# Patient Record
Sex: FEMALE | Race: ASIAN | NOT HISPANIC OR LATINO | URBAN - METROPOLITAN AREA
[De-identification: names, ages, dates, MRNs, and addresses within clinical notes are randomized per-mention and may not be internally consistent; named-entity substitution may affect disease eponyms.]

---

## 2018-06-25 ENCOUNTER — OFFICE VISIT - HEALTHEAST (OUTPATIENT)
Dept: INTERNAL MEDICINE | Facility: CLINIC | Age: 68
End: 2018-06-25

## 2018-06-25 DIAGNOSIS — Z78.0 POSTMENOPAUSAL STATUS: ICD-10-CM

## 2018-06-25 DIAGNOSIS — Z12.31 VISIT FOR SCREENING MAMMOGRAM: ICD-10-CM

## 2018-06-25 DIAGNOSIS — E11.40 TYPE 2 DIABETES MELLITUS WITH DIABETIC NEUROPATHY, WITHOUT LONG-TERM CURRENT USE OF INSULIN (H): ICD-10-CM

## 2018-06-25 DIAGNOSIS — E78.5 HYPERLIPIDEMIA LDL GOAL <100: ICD-10-CM

## 2018-06-25 DIAGNOSIS — I10 ESSENTIAL HYPERTENSION: ICD-10-CM

## 2018-06-25 DIAGNOSIS — N18.30 CHRONIC RENAL IMPAIRMENT, STAGE 3 (MODERATE) (H): ICD-10-CM

## 2018-06-25 DIAGNOSIS — F51.02 ADJUSTMENT INSOMNIA: ICD-10-CM

## 2018-06-25 DIAGNOSIS — Z23 NEED FOR VACCINATION: ICD-10-CM

## 2018-06-25 DIAGNOSIS — E55.9 VITAMIN D DEFICIENCY: ICD-10-CM

## 2018-06-25 LAB
ALBUMIN SERPL-MCNC: 3.9 G/DL (ref 3.5–5)
ALP SERPL-CCNC: 104 U/L (ref 45–120)
ALT SERPL W P-5'-P-CCNC: 23 U/L (ref 0–45)
ANION GAP SERPL CALCULATED.3IONS-SCNC: 13 MMOL/L (ref 5–18)
AST SERPL W P-5'-P-CCNC: 21 U/L (ref 0–40)
BASOPHILS # BLD AUTO: 0.1 THOU/UL (ref 0–0.2)
BASOPHILS NFR BLD AUTO: 1 % (ref 0–2)
BILIRUB SERPL-MCNC: 1.2 MG/DL (ref 0–1)
BUN SERPL-MCNC: 16 MG/DL (ref 8–22)
CALCIUM SERPL-MCNC: 9.6 MG/DL (ref 8.5–10.5)
CHLORIDE BLD-SCNC: 104 MMOL/L (ref 98–107)
CHOLEST SERPL-MCNC: 187 MG/DL
CO2 SERPL-SCNC: 24 MMOL/L (ref 22–31)
CREAT SERPL-MCNC: 2 MG/DL (ref 0.6–1.1)
CREAT UR-MCNC: 328.3 MG/DL
EOSINOPHIL # BLD AUTO: 0.2 THOU/UL (ref 0–0.4)
EOSINOPHIL NFR BLD AUTO: 3 % (ref 0–6)
ERYTHROCYTE [DISTWIDTH] IN BLOOD BY AUTOMATED COUNT: 12.9 % (ref 11–14.5)
FASTING STATUS PATIENT QL REPORTED: YES
GFR SERPL CREATININE-BSD FRML MDRD: 25 ML/MIN/1.73M2
GLUCOSE BLD-MCNC: 336 MG/DL (ref 70–125)
HBA1C MFR BLD: >14 % (ref 3.5–6)
HCT VFR BLD AUTO: 48.8 % (ref 35–47)
HDLC SERPL-MCNC: 48 MG/DL
HGB BLD-MCNC: 16.6 G/DL (ref 12–16)
LDLC SERPL CALC-MCNC: 80 MG/DL
LYMPHOCYTES # BLD AUTO: 2.1 THOU/UL (ref 0.8–4.4)
LYMPHOCYTES NFR BLD AUTO: 27 % (ref 20–40)
MCH RBC QN AUTO: 28.4 PG (ref 27–34)
MCHC RBC AUTO-ENTMCNC: 34 G/DL (ref 32–36)
MCV RBC AUTO: 83 FL (ref 80–100)
MICROALBUMIN UR-MCNC: 335.44 MG/DL (ref 0–1.99)
MICROALBUMIN/CREAT UR: 1021.7 MG/G
MONOCYTES # BLD AUTO: 0.6 THOU/UL (ref 0–0.9)
MONOCYTES NFR BLD AUTO: 7 % (ref 2–10)
NEUTROPHILS # BLD AUTO: 4.9 THOU/UL (ref 2–7.7)
NEUTROPHILS NFR BLD AUTO: 62 % (ref 50–70)
PLATELET # BLD AUTO: 207 THOU/UL (ref 140–440)
PMV BLD AUTO: 8.5 FL (ref 7–10)
POTASSIUM BLD-SCNC: 3.6 MMOL/L (ref 3.5–5)
PROT SERPL-MCNC: 6.6 G/DL (ref 6–8)
RBC # BLD AUTO: 5.85 MILL/UL (ref 3.8–5.4)
SODIUM SERPL-SCNC: 141 MMOL/L (ref 136–145)
TRIGL SERPL-MCNC: 293 MG/DL
TSH SERPL DL<=0.005 MIU/L-ACNC: 1.04 UIU/ML (ref 0.3–5)
WBC: 7.9 THOU/UL (ref 4–11)

## 2018-06-25 ASSESSMENT — MIFFLIN-ST. JEOR: SCORE: 1437.61

## 2018-06-26 ENCOUNTER — AMBULATORY - HEALTHEAST (OUTPATIENT)
Dept: PHARMACY | Facility: CLINIC | Age: 68
End: 2018-06-26

## 2018-06-26 ENCOUNTER — COMMUNICATION - HEALTHEAST (OUTPATIENT)
Dept: INTERNAL MEDICINE | Facility: CLINIC | Age: 68
End: 2018-06-26

## 2018-06-26 DIAGNOSIS — N18.4 CRI (CHRONIC RENAL INSUFFICIENCY), STAGE 4 (SEVERE) (H): ICD-10-CM

## 2018-06-26 DIAGNOSIS — E11.40 TYPE 2 DIABETES MELLITUS WITH DIABETIC NEUROPATHY, WITHOUT LONG-TERM CURRENT USE OF INSULIN (H): ICD-10-CM

## 2018-06-26 LAB — 25(OH)D3 SERPL-MCNC: 15.2 NG/ML (ref 30–80)

## 2018-06-28 ENCOUNTER — OFFICE VISIT - HEALTHEAST (OUTPATIENT)
Dept: PHARMACY | Facility: CLINIC | Age: 68
End: 2018-06-28

## 2018-06-28 DIAGNOSIS — I10 ESSENTIAL HYPERTENSION: ICD-10-CM

## 2018-06-28 DIAGNOSIS — F51.02 ADJUSTMENT INSOMNIA: ICD-10-CM

## 2018-06-28 DIAGNOSIS — E11.40 TYPE 2 DIABETES MELLITUS WITH DIABETIC NEUROPATHY, WITHOUT LONG-TERM CURRENT USE OF INSULIN (H): ICD-10-CM

## 2018-06-28 DIAGNOSIS — E78.5 HYPERLIPIDEMIA LDL GOAL <100: ICD-10-CM

## 2018-06-28 DIAGNOSIS — E55.9 VITAMIN D DEFICIENCY: ICD-10-CM

## 2018-06-29 ENCOUNTER — COMMUNICATION - HEALTHEAST (OUTPATIENT)
Dept: INTERNAL MEDICINE | Facility: CLINIC | Age: 68
End: 2018-06-29

## 2018-06-30 ENCOUNTER — COMMUNICATION - HEALTHEAST (OUTPATIENT)
Dept: NURSING | Facility: CLINIC | Age: 68
End: 2018-06-30

## 2018-07-12 ENCOUNTER — AMBULATORY - HEALTHEAST (OUTPATIENT)
Dept: EDUCATION SERVICES | Facility: CLINIC | Age: 68
End: 2018-07-12

## 2018-07-12 DIAGNOSIS — E11.40 TYPE 2 DIABETES MELLITUS WITH DIABETIC NEUROPATHY, WITHOUT LONG-TERM CURRENT USE OF INSULIN (H): ICD-10-CM

## 2018-07-16 ENCOUNTER — RECORDS - HEALTHEAST (OUTPATIENT)
Dept: ADMINISTRATIVE | Facility: OTHER | Age: 68
End: 2018-07-16

## 2018-07-26 ENCOUNTER — OFFICE VISIT - HEALTHEAST (OUTPATIENT)
Dept: PHARMACY | Facility: CLINIC | Age: 68
End: 2018-07-26

## 2018-07-26 DIAGNOSIS — I10 ESSENTIAL HYPERTENSION: ICD-10-CM

## 2018-07-26 DIAGNOSIS — E11.40 TYPE 2 DIABETES MELLITUS WITH DIABETIC NEUROPATHY, WITHOUT LONG-TERM CURRENT USE OF INSULIN (H): ICD-10-CM

## 2018-07-26 DIAGNOSIS — F51.02 ADJUSTMENT INSOMNIA: ICD-10-CM

## 2018-07-26 DIAGNOSIS — E78.5 HYPERLIPIDEMIA LDL GOAL <100: ICD-10-CM

## 2018-07-30 ENCOUNTER — RECORDS - HEALTHEAST (OUTPATIENT)
Dept: BONE DENSITY | Facility: CLINIC | Age: 68
End: 2018-07-30

## 2018-07-30 ENCOUNTER — RECORDS - HEALTHEAST (OUTPATIENT)
Dept: MAMMOGRAPHY | Facility: CLINIC | Age: 68
End: 2018-07-30

## 2018-07-30 ENCOUNTER — RECORDS - HEALTHEAST (OUTPATIENT)
Dept: ADMINISTRATIVE | Facility: OTHER | Age: 68
End: 2018-07-30

## 2018-07-30 DIAGNOSIS — Z12.31 ENCOUNTER FOR SCREENING MAMMOGRAM FOR MALIGNANT NEOPLASM OF BREAST: ICD-10-CM

## 2018-07-30 DIAGNOSIS — Z78.0 ASYMPTOMATIC MENOPAUSAL STATE: ICD-10-CM

## 2018-08-02 ENCOUNTER — OFFICE VISIT - HEALTHEAST (OUTPATIENT)
Dept: INTERNAL MEDICINE | Facility: CLINIC | Age: 68
End: 2018-08-02

## 2018-08-02 ENCOUNTER — COMMUNICATION - HEALTHEAST (OUTPATIENT)
Dept: INTERNAL MEDICINE | Facility: CLINIC | Age: 68
End: 2018-08-02

## 2018-08-02 ENCOUNTER — AMBULATORY - HEALTHEAST (OUTPATIENT)
Dept: LAB | Facility: CLINIC | Age: 68
End: 2018-08-02

## 2018-08-02 DIAGNOSIS — R06.83 SNORING: ICD-10-CM

## 2018-08-02 DIAGNOSIS — E11.40 TYPE 2 DIABETES MELLITUS WITH DIABETIC NEUROPATHY, WITHOUT LONG-TERM CURRENT USE OF INSULIN (H): ICD-10-CM

## 2018-08-02 DIAGNOSIS — D58.2 ELEVATED HEMOGLOBIN (H): ICD-10-CM

## 2018-08-02 DIAGNOSIS — I10 ESSENTIAL HYPERTENSION: ICD-10-CM

## 2018-08-02 LAB
ANION GAP SERPL CALCULATED.3IONS-SCNC: 8 MMOL/L (ref 5–18)
BUN SERPL-MCNC: 35 MG/DL (ref 8–22)
CALCIUM SERPL-MCNC: 9.5 MG/DL (ref 8.5–10.5)
CHLORIDE BLD-SCNC: 105 MMOL/L (ref 98–107)
CO2 SERPL-SCNC: 27 MMOL/L (ref 22–31)
CREAT SERPL-MCNC: 2.05 MG/DL (ref 0.6–1.1)
GFR SERPL CREATININE-BSD FRML MDRD: 24 ML/MIN/1.73M2
GLUCOSE BLD-MCNC: 155 MG/DL (ref 70–125)
HBA1C MFR BLD: 11.5 % (ref 3.5–6)
POTASSIUM BLD-SCNC: 5.1 MMOL/L (ref 3.5–5)
SODIUM SERPL-SCNC: 140 MMOL/L (ref 136–145)

## 2018-09-13 ENCOUNTER — OFFICE VISIT - HEALTHEAST (OUTPATIENT)
Dept: PHARMACY | Facility: CLINIC | Age: 68
End: 2018-09-13

## 2018-09-13 ENCOUNTER — COMMUNICATION - HEALTHEAST (OUTPATIENT)
Dept: PHARMACY | Facility: CLINIC | Age: 68
End: 2018-09-13

## 2018-09-13 DIAGNOSIS — E11.40 TYPE 2 DIABETES MELLITUS WITH DIABETIC NEUROPATHY, WITHOUT LONG-TERM CURRENT USE OF INSULIN (H): ICD-10-CM

## 2018-09-20 ENCOUNTER — OFFICE VISIT - HEALTHEAST (OUTPATIENT)
Dept: SLEEP MEDICINE | Facility: CLINIC | Age: 68
End: 2018-09-20

## 2018-09-20 DIAGNOSIS — Z91.89 AT RISK FOR SLEEP APNEA: ICD-10-CM

## 2018-09-20 DIAGNOSIS — G47.21 DELAYED SLEEP PHASE SYNDROME: ICD-10-CM

## 2018-09-20 DIAGNOSIS — R06.83 SNORING: ICD-10-CM

## 2018-09-20 DIAGNOSIS — G47.10 HYPERSOMNIA: ICD-10-CM

## 2018-09-20 ASSESSMENT — MIFFLIN-ST. JEOR: SCORE: 1501.11

## 2018-09-21 ENCOUNTER — COMMUNICATION - HEALTHEAST (OUTPATIENT)
Dept: PHARMACY | Facility: CLINIC | Age: 68
End: 2018-09-21

## 2018-12-20 ENCOUNTER — COMMUNICATION - HEALTHEAST (OUTPATIENT)
Dept: INTERNAL MEDICINE | Facility: CLINIC | Age: 68
End: 2018-12-20

## 2018-12-31 ENCOUNTER — COMMUNICATION - HEALTHEAST (OUTPATIENT)
Dept: INTERNAL MEDICINE | Facility: CLINIC | Age: 68
End: 2018-12-31

## 2018-12-31 DIAGNOSIS — E11.40 TYPE 2 DIABETES MELLITUS WITH DIABETIC NEUROPATHY, WITHOUT LONG-TERM CURRENT USE OF INSULIN (H): ICD-10-CM

## 2019-03-15 ENCOUNTER — RECORDS - HEALTHEAST (OUTPATIENT)
Dept: HEALTH INFORMATION MANAGEMENT | Facility: CLINIC | Age: 69
End: 2019-03-15

## 2019-03-21 ENCOUNTER — COMMUNICATION - HEALTHEAST (OUTPATIENT)
Dept: INTERNAL MEDICINE | Facility: CLINIC | Age: 69
End: 2019-03-21

## 2019-03-21 DIAGNOSIS — E11.40 TYPE 2 DIABETES MELLITUS WITH DIABETIC NEUROPATHY, WITHOUT LONG-TERM CURRENT USE OF INSULIN (H): ICD-10-CM

## 2019-03-25 ENCOUNTER — COMMUNICATION - HEALTHEAST (OUTPATIENT)
Dept: INTERNAL MEDICINE | Facility: CLINIC | Age: 69
End: 2019-03-25

## 2019-03-25 DIAGNOSIS — E55.9 VITAMIN D DEFICIENCY: ICD-10-CM

## 2019-03-25 DIAGNOSIS — E78.5 HYPERLIPIDEMIA LDL GOAL <100: ICD-10-CM

## 2019-03-29 ENCOUNTER — RECORDS - HEALTHEAST (OUTPATIENT)
Dept: ADMINISTRATIVE | Facility: OTHER | Age: 69
End: 2019-03-29

## 2019-04-11 ENCOUNTER — OFFICE VISIT - HEALTHEAST (OUTPATIENT)
Dept: INTERNAL MEDICINE | Facility: CLINIC | Age: 69
End: 2019-04-11

## 2019-04-11 DIAGNOSIS — N28.9 RENAL INSUFFICIENCY: ICD-10-CM

## 2019-04-11 DIAGNOSIS — R06.83 SNORING: ICD-10-CM

## 2019-04-11 DIAGNOSIS — J30.1 SEASONAL ALLERGIC RHINITIS DUE TO POLLEN: ICD-10-CM

## 2019-04-11 DIAGNOSIS — I10 ESSENTIAL HYPERTENSION: ICD-10-CM

## 2019-04-11 DIAGNOSIS — D58.2 ELEVATED HEMOGLOBIN (H): ICD-10-CM

## 2019-04-11 DIAGNOSIS — E11.40 TYPE 2 DIABETES MELLITUS WITH DIABETIC NEUROPATHY, WITHOUT LONG-TERM CURRENT USE OF INSULIN (H): ICD-10-CM

## 2019-04-11 DIAGNOSIS — Z23 NEED FOR 23-POLYVALENT PNEUMOCOCCAL POLYSACCHARIDE VACCINE: ICD-10-CM

## 2019-04-11 DIAGNOSIS — E55.9 VITAMIN D DEFICIENCY: ICD-10-CM

## 2019-04-11 LAB
ANION GAP SERPL CALCULATED.3IONS-SCNC: 11 MMOL/L (ref 5–18)
BASOPHILS # BLD AUTO: 0.1 THOU/UL (ref 0–0.2)
BASOPHILS NFR BLD AUTO: 1 % (ref 0–2)
BUN SERPL-MCNC: 26 MG/DL (ref 8–22)
CALCIUM SERPL-MCNC: 9.9 MG/DL (ref 8.5–10.5)
CHLORIDE BLD-SCNC: 109 MMOL/L (ref 98–107)
CO2 SERPL-SCNC: 26 MMOL/L (ref 22–31)
CREAT SERPL-MCNC: 2.03 MG/DL (ref 0.6–1.1)
CREAT UR-MCNC: 281.3 MG/DL
EOSINOPHIL # BLD AUTO: 0.4 THOU/UL (ref 0–0.4)
EOSINOPHIL NFR BLD AUTO: 4 % (ref 0–6)
ERYTHROCYTE [DISTWIDTH] IN BLOOD BY AUTOMATED COUNT: 11.6 % (ref 11–14.5)
GFR SERPL CREATININE-BSD FRML MDRD: 24 ML/MIN/1.73M2
GLUCOSE BLD-MCNC: 101 MG/DL (ref 70–125)
HBA1C MFR BLD: 5.9 % (ref 3.5–6)
HCT VFR BLD AUTO: 42.2 % (ref 35–47)
HGB BLD-MCNC: 14.1 G/DL (ref 12–16)
LYMPHOCYTES # BLD AUTO: 2.2 THOU/UL (ref 0.8–4.4)
LYMPHOCYTES NFR BLD AUTO: 24 % (ref 20–40)
MCH RBC QN AUTO: 28.4 PG (ref 27–34)
MCHC RBC AUTO-ENTMCNC: 33.4 G/DL (ref 32–36)
MCV RBC AUTO: 85 FL (ref 80–100)
MICROALBUMIN UR-MCNC: 153.47 MG/DL (ref 0–1.99)
MICROALBUMIN/CREAT UR: 545.6 MG/G
MONOCYTES # BLD AUTO: 0.5 THOU/UL (ref 0–0.9)
MONOCYTES NFR BLD AUTO: 6 % (ref 2–10)
NEUTROPHILS # BLD AUTO: 5.9 THOU/UL (ref 2–7.7)
NEUTROPHILS NFR BLD AUTO: 65 % (ref 50–70)
PLATELET # BLD AUTO: 219 THOU/UL (ref 140–440)
PMV BLD AUTO: 7.9 FL (ref 7–10)
POTASSIUM BLD-SCNC: 4.3 MMOL/L (ref 3.5–5)
RBC # BLD AUTO: 4.96 MILL/UL (ref 3.8–5.4)
SODIUM SERPL-SCNC: 146 MMOL/L (ref 136–145)
WBC: 9.1 THOU/UL (ref 4–11)

## 2019-04-11 RX ORDER — LORATADINE 10 MG/1
10 TABLET ORAL DAILY
Qty: 30 TABLET | Refills: 11 | Status: SHIPPED | OUTPATIENT
Start: 2019-04-11

## 2019-04-11 ASSESSMENT — MIFFLIN-ST. JEOR: SCORE: 1421.73

## 2019-04-12 LAB
25(OH)D3 SERPL-MCNC: 32.8 NG/ML (ref 30–80)
25(OH)D3 SERPL-MCNC: 32.8 NG/ML (ref 30–80)

## 2019-04-13 ENCOUNTER — COMMUNICATION - HEALTHEAST (OUTPATIENT)
Dept: INTERNAL MEDICINE | Facility: CLINIC | Age: 69
End: 2019-04-13

## 2019-04-14 ENCOUNTER — COMMUNICATION - HEALTHEAST (OUTPATIENT)
Dept: INTERNAL MEDICINE | Facility: CLINIC | Age: 69
End: 2019-04-14

## 2019-04-14 DIAGNOSIS — E11.40 TYPE 2 DIABETES MELLITUS WITH DIABETIC NEUROPATHY, WITHOUT LONG-TERM CURRENT USE OF INSULIN (H): ICD-10-CM

## 2019-04-22 ENCOUNTER — COMMUNICATION - HEALTHEAST (OUTPATIENT)
Dept: NURSING | Facility: CLINIC | Age: 69
End: 2019-04-22

## 2019-04-24 ENCOUNTER — RECORDS - HEALTHEAST (OUTPATIENT)
Dept: HEALTH INFORMATION MANAGEMENT | Facility: CLINIC | Age: 69
End: 2019-04-24

## 2019-04-26 ENCOUNTER — COMMUNICATION - HEALTHEAST (OUTPATIENT)
Dept: INTERNAL MEDICINE | Facility: CLINIC | Age: 69
End: 2019-04-26

## 2019-04-26 ENCOUNTER — COMMUNICATION - HEALTHEAST (OUTPATIENT)
Dept: NURSING | Facility: CLINIC | Age: 69
End: 2019-04-26

## 2019-04-29 ENCOUNTER — COMMUNICATION - HEALTHEAST (OUTPATIENT)
Dept: NURSING | Facility: CLINIC | Age: 69
End: 2019-04-29

## 2019-04-29 DIAGNOSIS — E11.40 TYPE 2 DIABETES MELLITUS WITH DIABETIC NEUROPATHY, WITHOUT LONG-TERM CURRENT USE OF INSULIN (H): ICD-10-CM

## 2019-05-03 ENCOUNTER — COMMUNICATION - HEALTHEAST (OUTPATIENT)
Dept: INTERNAL MEDICINE | Facility: CLINIC | Age: 69
End: 2019-05-03

## 2019-05-16 ENCOUNTER — COMMUNICATION - HEALTHEAST (OUTPATIENT)
Dept: INTERNAL MEDICINE | Facility: CLINIC | Age: 69
End: 2019-05-16

## 2019-06-16 ENCOUNTER — COMMUNICATION - HEALTHEAST (OUTPATIENT)
Dept: NURSING | Facility: CLINIC | Age: 69
End: 2019-06-16

## 2019-06-25 ENCOUNTER — RECORDS - HEALTHEAST (OUTPATIENT)
Dept: ADMINISTRATIVE | Facility: OTHER | Age: 69
End: 2019-06-25

## 2019-06-27 ENCOUNTER — OFFICE VISIT - HEALTHEAST (OUTPATIENT)
Dept: INTERNAL MEDICINE | Facility: CLINIC | Age: 69
End: 2019-06-27

## 2019-06-27 DIAGNOSIS — N18.4 CHRONIC RENAL IMPAIRMENT, STAGE 4 (SEVERE) (H): ICD-10-CM

## 2019-06-27 DIAGNOSIS — I10 ESSENTIAL HYPERTENSION: ICD-10-CM

## 2019-06-27 DIAGNOSIS — R05.9 COUGH: ICD-10-CM

## 2019-06-27 DIAGNOSIS — Z00.00 ROUTINE GENERAL MEDICAL EXAMINATION AT A HEALTH CARE FACILITY: ICD-10-CM

## 2019-06-27 DIAGNOSIS — N93.9 VAGINAL SPOTTING: ICD-10-CM

## 2019-06-27 DIAGNOSIS — E11.40 TYPE 2 DIABETES MELLITUS WITH DIABETIC NEUROPATHY, WITHOUT LONG-TERM CURRENT USE OF INSULIN (H): ICD-10-CM

## 2019-06-27 LAB
BASOPHILS # BLD AUTO: 0 THOU/UL (ref 0–0.2)
BASOPHILS NFR BLD AUTO: 1 % (ref 0–2)
EOSINOPHIL # BLD AUTO: 0.2 THOU/UL (ref 0–0.4)
EOSINOPHIL NFR BLD AUTO: 3 % (ref 0–6)
ERYTHROCYTE [DISTWIDTH] IN BLOOD BY AUTOMATED COUNT: 12.4 % (ref 11–14.5)
HBA1C MFR BLD: 6.4 % (ref 3.5–6)
HCT VFR BLD AUTO: 36.3 % (ref 35–47)
HGB BLD-MCNC: 12.2 G/DL (ref 12–16)
LYMPHOCYTES # BLD AUTO: 2.1 THOU/UL (ref 0.8–4.4)
LYMPHOCYTES NFR BLD AUTO: 32 % (ref 20–40)
MCH RBC QN AUTO: 28.4 PG (ref 27–34)
MCHC RBC AUTO-ENTMCNC: 33.6 G/DL (ref 32–36)
MCV RBC AUTO: 85 FL (ref 80–100)
MONOCYTES # BLD AUTO: 0.5 THOU/UL (ref 0–0.9)
MONOCYTES NFR BLD AUTO: 8 % (ref 2–10)
NEUTROPHILS # BLD AUTO: 3.8 THOU/UL (ref 2–7.7)
NEUTROPHILS NFR BLD AUTO: 57 % (ref 50–70)
PLATELET # BLD AUTO: 212 THOU/UL (ref 140–440)
PMV BLD AUTO: 8.3 FL (ref 7–10)
RBC # BLD AUTO: 4.29 MILL/UL (ref 3.8–5.4)
TSH SERPL DL<=0.005 MIU/L-ACNC: 0.39 UIU/ML (ref 0.3–5)
WBC: 6.7 THOU/UL (ref 4–11)

## 2019-06-27 RX ORDER — CARVEDILOL 25 MG/1
25 TABLET ORAL 2 TIMES DAILY
Qty: 180 TABLET | Refills: 3 | Status: SHIPPED | OUTPATIENT
Start: 2019-06-27 | End: 2019-09-25

## 2019-06-27 RX ORDER — AMLODIPINE BESYLATE 10 MG/1
10 TABLET ORAL DAILY
Qty: 90 TABLET | Refills: 3 | Status: SHIPPED | OUTPATIENT
Start: 2019-06-27 | End: 2019-09-25

## 2019-06-27 ASSESSMENT — MIFFLIN-ST. JEOR: SCORE: 1411.87

## 2019-06-29 ENCOUNTER — COMMUNICATION - HEALTHEAST (OUTPATIENT)
Dept: INTERNAL MEDICINE | Facility: CLINIC | Age: 69
End: 2019-06-29

## 2019-06-29 DIAGNOSIS — E11.40 TYPE 2 DIABETES MELLITUS WITH DIABETIC NEUROPATHY, WITHOUT LONG-TERM CURRENT USE OF INSULIN (H): ICD-10-CM

## 2019-06-29 DIAGNOSIS — I10 ESSENTIAL HYPERTENSION: ICD-10-CM

## 2019-06-30 ENCOUNTER — COMMUNICATION - HEALTHEAST (OUTPATIENT)
Dept: INTERNAL MEDICINE | Facility: CLINIC | Age: 69
End: 2019-06-30

## 2019-06-30 DIAGNOSIS — E11.40 TYPE 2 DIABETES MELLITUS WITH DIABETIC NEUROPATHY, WITHOUT LONG-TERM CURRENT USE OF INSULIN (H): ICD-10-CM

## 2019-06-30 RX ORDER — GLUCOSAMINE HCL/CHONDROITIN SU 500-400 MG
1 CAPSULE ORAL
Qty: 100 STRIP | Refills: 3 | Status: SHIPPED | OUTPATIENT
Start: 2019-06-30

## 2019-07-01 ENCOUNTER — HOSPITAL ENCOUNTER (OUTPATIENT)
Dept: ULTRASOUND IMAGING | Facility: HOSPITAL | Age: 69
Discharge: HOME | End: 2019-07-01
Attending: INTERNAL MEDICINE

## 2019-07-01 DIAGNOSIS — N93.9 VAGINAL SPOTTING: ICD-10-CM

## 2019-07-01 DIAGNOSIS — N18.4 CHRONIC RENAL DISEASE, STAGE IV (H): ICD-10-CM

## 2019-07-08 ENCOUNTER — COMMUNICATION - HEALTHEAST (OUTPATIENT)
Dept: INTERNAL MEDICINE | Facility: CLINIC | Age: 69
End: 2019-07-08

## 2019-07-08 DIAGNOSIS — E11.40 TYPE 2 DIABETES MELLITUS WITH DIABETIC NEUROPATHY, WITHOUT LONG-TERM CURRENT USE OF INSULIN (H): ICD-10-CM

## 2019-07-09 RX ORDER — PEN NEEDLE, DIABETIC 32GX 5/32"
NEEDLE, DISPOSABLE MISCELLANEOUS
Qty: 100 EACH | Refills: 3 | Status: SHIPPED | OUTPATIENT
Start: 2019-07-09

## 2019-07-11 ENCOUNTER — COMMUNICATION - HEALTHEAST (OUTPATIENT)
Dept: NURSING | Facility: CLINIC | Age: 69
End: 2019-07-11

## 2019-07-11 ENCOUNTER — COMMUNICATION - HEALTHEAST (OUTPATIENT)
Dept: INTERNAL MEDICINE | Facility: CLINIC | Age: 69
End: 2019-07-11

## 2019-07-11 DIAGNOSIS — E11.40 TYPE 2 DIABETES MELLITUS WITH DIABETIC NEUROPATHY, WITHOUT LONG-TERM CURRENT USE OF INSULIN (H): ICD-10-CM

## 2019-07-12 ENCOUNTER — AMBULATORY - HEALTHEAST (OUTPATIENT)
Dept: INTERNAL MEDICINE | Facility: CLINIC | Age: 69
End: 2019-07-12

## 2019-07-12 ENCOUNTER — COMMUNICATION - HEALTHEAST (OUTPATIENT)
Dept: INTERNAL MEDICINE | Facility: CLINIC | Age: 69
End: 2019-07-12

## 2019-07-18 ENCOUNTER — RECORDS - HEALTHEAST (OUTPATIENT)
Dept: ADMINISTRATIVE | Facility: OTHER | Age: 69
End: 2019-07-18

## 2019-07-18 ENCOUNTER — COMMUNICATION - HEALTHEAST (OUTPATIENT)
Dept: INTERNAL MEDICINE | Facility: CLINIC | Age: 69
End: 2019-07-18

## 2019-07-18 DIAGNOSIS — E11.40 TYPE 2 DIABETES MELLITUS WITH DIABETIC NEUROPATHY, WITHOUT LONG-TERM CURRENT USE OF INSULIN (H): ICD-10-CM

## 2019-07-18 RX ORDER — LANCETS 33 GAUGE
EACH MISCELLANEOUS
Qty: 200 EACH | Refills: 3 | Status: SHIPPED | OUTPATIENT
Start: 2019-07-18

## 2019-07-19 ENCOUNTER — COMMUNICATION - HEALTHEAST (OUTPATIENT)
Dept: INTERNAL MEDICINE | Facility: CLINIC | Age: 69
End: 2019-07-19

## 2019-07-19 ENCOUNTER — COMMUNICATION - HEALTHEAST (OUTPATIENT)
Dept: NURSING | Facility: CLINIC | Age: 69
End: 2019-07-19

## 2019-07-19 DIAGNOSIS — E11.9 TYPE 2 DIABETES MELLITUS WITHOUT COMPLICATION, WITH LONG-TERM CURRENT USE OF INSULIN (H): ICD-10-CM

## 2019-07-19 DIAGNOSIS — Z79.4 TYPE 2 DIABETES MELLITUS WITHOUT COMPLICATION, WITH LONG-TERM CURRENT USE OF INSULIN (H): ICD-10-CM

## 2019-07-19 RX ORDER — GLUCOSAMINE HCL/CHONDROITIN SU 500-400 MG
1 CAPSULE ORAL 3 TIMES DAILY
Qty: 400 STRIP | Refills: 3 | Status: SHIPPED | OUTPATIENT
Start: 2019-07-19

## 2019-07-23 ENCOUNTER — RECORDS - HEALTHEAST (OUTPATIENT)
Dept: HEALTH INFORMATION MANAGEMENT | Facility: CLINIC | Age: 69
End: 2019-07-23

## 2019-08-07 ENCOUNTER — COMMUNICATION - HEALTHEAST (OUTPATIENT)
Dept: INTERNAL MEDICINE | Facility: CLINIC | Age: 69
End: 2019-08-07

## 2019-08-07 DIAGNOSIS — E11.40 TYPE 2 DIABETES MELLITUS WITH DIABETIC NEUROPATHY, WITHOUT LONG-TERM CURRENT USE OF INSULIN (H): ICD-10-CM

## 2019-08-18 ENCOUNTER — COMMUNICATION - HEALTHEAST (OUTPATIENT)
Dept: INTERNAL MEDICINE | Facility: CLINIC | Age: 69
End: 2019-08-18

## 2019-08-18 DIAGNOSIS — E11.40 TYPE 2 DIABETES MELLITUS WITH DIABETIC NEUROPATHY, WITHOUT LONG-TERM CURRENT USE OF INSULIN (H): ICD-10-CM

## 2019-10-07 ENCOUNTER — COMMUNICATION - HEALTHEAST (OUTPATIENT)
Dept: INTERNAL MEDICINE | Facility: CLINIC | Age: 69
End: 2019-10-07

## 2019-10-07 DIAGNOSIS — E78.5 HYPERLIPIDEMIA LDL GOAL <100: ICD-10-CM

## 2019-10-07 DIAGNOSIS — J30.1 SEASONAL ALLERGIC RHINITIS DUE TO POLLEN: ICD-10-CM

## 2019-10-09 RX ORDER — ATORVASTATIN CALCIUM 20 MG/1
TABLET, FILM COATED ORAL
Qty: 90 TABLET | Refills: 2 | Status: SHIPPED | OUTPATIENT
Start: 2019-10-09

## 2020-01-25 ENCOUNTER — COMMUNICATION - HEALTHEAST (OUTPATIENT)
Dept: INTERNAL MEDICINE | Facility: CLINIC | Age: 70
End: 2020-01-25

## 2020-01-25 DIAGNOSIS — I10 ESSENTIAL HYPERTENSION: ICD-10-CM

## 2020-01-25 DIAGNOSIS — Z79.4 TYPE 2 DIABETES MELLITUS WITHOUT COMPLICATION, WITH LONG-TERM CURRENT USE OF INSULIN (H): ICD-10-CM

## 2020-01-25 DIAGNOSIS — E11.9 TYPE 2 DIABETES MELLITUS WITHOUT COMPLICATION, WITH LONG-TERM CURRENT USE OF INSULIN (H): ICD-10-CM

## 2020-01-26 RX ORDER — HYDRALAZINE HYDROCHLORIDE 50 MG/1
TABLET, FILM COATED ORAL
Qty: 180 TABLET | Refills: 2 | Status: SHIPPED | OUTPATIENT
Start: 2020-01-26

## 2020-01-26 RX ORDER — BENAZEPRIL HYDROCHLORIDE 20 MG/1
TABLET ORAL
Qty: 90 TABLET | Refills: 2 | Status: SHIPPED | OUTPATIENT
Start: 2020-01-26

## 2020-08-12 ENCOUNTER — COMMUNICATION - HEALTHEAST (OUTPATIENT)
Dept: INTERNAL MEDICINE | Facility: CLINIC | Age: 70
End: 2020-08-12

## 2020-08-12 DIAGNOSIS — E11.40 TYPE 2 DIABETES MELLITUS WITH DIABETIC NEUROPATHY, WITHOUT LONG-TERM CURRENT USE OF INSULIN (H): ICD-10-CM

## 2020-11-11 ENCOUNTER — COMMUNICATION - HEALTHEAST (OUTPATIENT)
Dept: INTERNAL MEDICINE | Facility: CLINIC | Age: 70
End: 2020-11-11

## 2020-11-11 DIAGNOSIS — E11.40 TYPE 2 DIABETES MELLITUS WITH DIABETIC NEUROPATHY, WITHOUT LONG-TERM CURRENT USE OF INSULIN (H): ICD-10-CM

## 2020-11-11 RX ORDER — SEMAGLUTIDE 1.34 MG/ML
INJECTION, SOLUTION SUBCUTANEOUS
Qty: 3 SYRINGE | Refills: 6 | Status: SHIPPED | OUTPATIENT
Start: 2020-11-11 | End: 2021-12-31

## 2021-05-26 NOTE — TELEPHONE ENCOUNTER
RN cannot approve Refill Request    RN can NOT refill this medication Protocol failed and NO refill given.         Caitlin Wyatt, [facility] Triage/Med Refill 3/22/2019    Requested Prescriptions   Pending Prescriptions Disp Refills     LANTUS SOLOSTAR U-100 INSULIN 100 unit/mL (3 mL) pen [Pharmacy Med Name: LANTUS SOLOSTAR PEN INJ 3ML] 9 mL 0     Sig: INJECT 10 UNITS EVERY NIGHT AT BEDTIME    Insulin/GLP-1 Refill Protocol Failed - 3/21/2019  3:23 PM       Failed - Visit with PCP or prescribing provider visit in last 6 months    Last office visit with prescriber/PCP: Visit date not found OR same dept: 8/2/2018 Airam Muhammad MD OR same specialty: 8/2/2018 Airam Muhmamad MD Last physical: Visit date not found Last MTM visit: Visit date not found     Next appt within 3 mo: Visit date not found  Next physical within 3 mo: Visit date not found  Prescriber OR PCP: Airam Muhammad MD  Last diagnosis associated with med order: 1. Type 2 diabetes mellitus with diabetic neuropathy, without long-term current use of insulin (H)  - LANTUS SOLOSTAR U-100 INSULIN 100 unit/mL (3 mL) pen [Pharmacy Med Name: LANTUS SOLOSTAR PEN INJ 3ML]; INJECT 10 UNITS EVERY NIGHT AT BEDTIME  Dispense: 9 mL; Refill: 0    If protocol passes may refill for 6 months if within 3 months of last provider visit (or a total of 9 months).             Failed - A1C in last 6 months    Hemoglobin A1c   Date Value Ref Range Status   08/02/2018 11.5 (H) 3.5 - 6.0 % Final              Passed - Microalbumin in last year    Microalbumin, Random Urine   Date Value Ref Range Status   06/25/2018 335.44 (H) 0.00 - 1.99 mg/dL Final                 Passed - Blood pressure in last year    BP Readings from Last 1 Encounters:   09/20/18 146/75            Passed - Creatinine done in last year    Creatinine   Date Value Ref Range Status   08/02/2018 2.05 (H) 0.60 - 1.10 mg/dL Final

## 2021-05-27 NOTE — PATIENT INSTRUCTIONS - HE
1. Due to low sugars in the morning, let's decrease Glipizide to 5 mg with dinner. Continue 10 mg with breakfast. Continue current dose of ozempic and Lantus.    2. Increase hydralazine from 25 mg to 50 mg twice     3. Get shingles vaccine and tetanus booster at the pharmacy    4. See kidney doctor to establish care due to renal problems and protein in the urine.    5. Call back with complete sugar report for past 2 weeks tomorrow and also call again with sugar records in 2 weeks.     6. Pneumonia shot today

## 2021-05-27 NOTE — TELEPHONE ENCOUNTER
Patient Returning Call  Reason for call:  Returning call from clinic  Information relayed to patient:  Message below. Patient states she is currently taking 20 units of Lantus in addition to Ozempick. Patient scheduled for a diabetic check with PCP on 4/11/19.   Patient has additional questions:  No  If YES, what are your questions/concerns:  N/A  Okay to leave a detailed message?: No call back needed

## 2021-05-27 NOTE — TELEPHONE ENCOUNTER
Bebeto Jade,    Please talk to this patient ( please see my note).  She could not access her sugar report during our last visit. A1C is much improved since we added Ozempic. She is still on the low dose 0.25 and I did not want to increase it since potentially it can affect her kidneys (from SE review). My concern now is hypoglycemia. She is on insulin and glipizide. Creat is 2.0. I decreased her evening glipizide and would like to make sure sugars are better.  Please contact her non urgently sometime this week to review..    Thank you!  Dr WALDROP

## 2021-05-27 NOTE — PROGRESS NOTES
[facility] Follow Up Note    Assessment/Plan:    1. Type 2 diabetes mellitus with diabetic neuropathy, without long-term current use of insulin (H)  Management is complicated by renal insufficiency and creatinine at 2.0.  Previous A1c was 11.5 and we will repeat it again today however home sugar measurements would be most accurate.  She could not open her To review her sugars with me but will email me through my chart.  Per her report morning sugars have been in good range and she has been having more low sugars down to 50 or 60 in the morning.  Since breakfast is her largest meal and dinner is her smallest meal will decrease her glipizide to 5 mg with dinner and she will continue 10 mg with breakfast.  We will continue her currently on a smaller dose so was then pick at 0.25 mg due to her history of renal insufficiency (potentially it can affect renal function).  She will continue 20 units of Lantus.  She is on aspirin.  We will need to control her blood pressure better.  She reports that she saw ophthalmologist earlier this year and does not have retinopathy.  DL was 80 in summer 2018.  - glipiZIDE (GLUCOTROL) 10 MG tablet; Take 1 tab with breakfast and 1/2 tab with dinner  Dispense: 135 tablet; Refill: 3  - blood glucose test strips; Use 1 each As Directed 3 (three) times a day before meals. Dispense brand per patient's insurance at pharmacy discretion.  Dispense: 100 strip; Refill: 11  - Glycosylated Hemoglobin A1c  - Microalbumin, Random Urine  - Basic Metabolic Panel    2. Need for 23-polyvalent pneumococcal polysaccharide vaccine  - Pneumococcal polysaccharide vaccine 23-valent 3 yo or older, subq/IM    3. Renal insufficiency with proteinuria  She is on ACE inhibitor, potassium levels are good, hemoglobin levels are good.  Renal function has been stable since 2016 and will continue to monitor it.  Since she has stage IV renal disease I did recommend that she sees a nephrologist to establish  care.  - Basic Metabolic Panel  - Ambulatory referral to Nephrology  - HM1(CBC and Differential)  - HM1 (CBC with Diff)    4. Vitamin D deficiency  Takes vitamin D 1000 units 3 times a day  - Vitamin D, Total (25-Hydroxy)    5. Elevated hemoglobin (H)  He was checked on her sugars were uncontrolled so could be due to dehydration of sleep apnea.  We will check CBC today    6. Essential hypertension  She is on numerous blood pressure medications including amlodipine 10 mg, benazepril 20 mg, Coreg 25 mg twice a day and hydralazine 25 twice a day.  Discussed the goal blood pressure less than 130 and she is above goal.  We will increase her hydralazine to 50 mg twice a day  - hydrALAZINE (APRESOLINE) 50 MG tablet; Take 1 tablet (50 mg total) by mouth 2 (two) times a day.  Dispense: 180 tablet; Refill: 2    7. Snoring  Mild.  In the past to refer her to sleep clinic but patient did not go there    8. Seasonal allergic rhinitis due to pollen  - fluticasone propionate (FLONASE ALLERGY RELIEF) 50 mcg/actuation nasal spray; 1 spray into each nostril daily.  Dispense: 16 g; Refill: 2  - loratadine (CLARITIN) 10 mg tablet; Take 1 tablet (10 mg total) by mouth daily.  Dispense: 30 tablet; Refill: 11      Airam Muhammad MD    Chief Complaint:  Chief Complaint   Patient presents with     Diabetes Mellitus       History of Present Illness:  Samira is a 68 y.o. female with history of type 2 diabetes, CKD, neuropathy, high blood pressure, hyperlipidemia, history of cholecystectomy, appendectomy and uterine fibroids.   She is currently here for follow-up.  She is accompanied by her daughter.    Patient has type 2 diabetes and renal insufficiency with creatinine of 2.0.  Last time I saw her over 6 months ago and her A1c was 11.  At that time she was on Lantus 20 units and glipizide.  She saw pharmacist and was started on on exam PICC and currently takes 0.25 mg once a week.  She feels that her sugars have gotten better.  She was  unable to open her On the phone to show me the complete data but reports that in the mornings they usually between 90 and 120 however she has been having sugars as low as 50 sometimes in the morning.  She takes Lantus 20 units in the evening and glipizide 10 mg twice a day.  Breakfast is her biggest meal and then she eats less for lunch and dinner.  We discussed decreasing her glipizide dose to 5 mg with dinner and continuing 10 with breakfast.  For now we will keep the same dose of Lantus and as them pack.  It appears that as empiric potentially can affect kidney function and currently I am not going to increase it further.    Patient also has stage IV renal insufficiency with proteinuria.  We will check her creatinine today but from chart review it appears since 2016 her creatinine level has been stable.  No history of anemia.  We discussed that would be a good time for her to establish care with nephrologist and referral was provided.  Her blood pressure continues to be above goal.  She is currently on multiple medications including benazepril, amlodipine, Coreg and hydralazine.  On last visit I increased her hydralazine from 10-25 twice a day.  Today since blood pressure is above 130 will increase it further to 50 mg twice a day new prescription was sent to pharmacy.  She does have mild snoring in the past to refer her to sleep clinic but she has not visited there.  We will see what hemoglobin level is today (in the past it has been elevated potentially secondary to dehydration due to high sugars or sleep apnea.    Currently she tries to stay healthy and walks to 3 miles a day when the weather is good.  She did see ophthalmologist recently and has not had any retinopathy but does have glaucoma.  Since she started was then picked she did lose 9 pounds.      Review of Systems:  A comprehensive review of systems was performed and was otherwise negative    PFSH:  Social History: Reviewed, she has kids in different  "states and travels a log.  She is going to [place] for a month and a half in 1 week.  Social History     Tobacco Use   Smoking Status Former Smoker     Years: 10.00   Smokeless Tobacco Never Used     Social History     Social History Narrative    Pt moved from [place] to [place] in 2018. 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 Travels a lot.       Past History: Reviewed  Current Outpatient Medications   Medication Sig Dispense Refill     aspirin 81 MG EC tablet Take 1 tablet (81 mg total) by mouth daily.  0     atorvastatin (LIPITOR) 20 MG tablet Take 1 tablet (20 mg total) by mouth daily. 90 tablet 1     benazepril (LOTENSIN) 20 MG tablet Take 1 tablet (20 mg total) by mouth daily. 90 tablet 3     blood glucose meter (GLUCOMETER) Use 1 each As Directed 2 (two) times a day. Dispense glucometer brand per patient's insurance at pharmacy discretion. 1 each 0     blood glucose test strips Use 1 each As Directed 3 (three) times a day before meals. Dispense brand per patient's insurance at pharmacy discretion. 100 strip 11     cholecalciferol, vitamin D3, 1,000 unit tablet TAKE THREE TABLETS (3000 IU) BY MOUTH DAILY 270 tablet 1     fluticasone propionate (FLONASE ALLERGY RELIEF) 50 mcg/actuation nasal spray 1 spray into each nostril daily. 16 g 2     generic lancets Use 1 each As Directed 2 (two) times a day. Dispense brand per patient's insurance at pharmacy discretion. 100 each 11     insulin glargine (LANTUS SOLOSTAR U-100 INSULIN) 100 unit/mL (3 mL) pen Inject 20 units at bed time 9 mL 11     OZEMPIC 0.25 mg or 0.5 mg(2 mg/1.5 mL) PnIj Inject 0.25 mg under the skin every 7 days. For four weeks, then increase to 0.5mg weekly for four weeks. 1.5 mL 3     pen needle, diabetic ([employer] PEN NEEDLE) 32 gauge x 5/32\" Ndle Use 1 each As Directed daily. With lantus 100 each 3     amLODIPine (NORVASC) 10 MG tablet Take 1 tablet (10 mg total) by mouth daily. 90 tablet 3     carvedilol (COREG) 25 MG tablet Take 1 tablet (25 " "mg total) by mouth 2 (two) times a day. 180 tablet 3     glipiZIDE (GLUCOTROL) 10 MG tablet Take 1 tab with breakfast and 1/2 tab with dinner 135 tablet 3     hydrALAZINE (APRESOLINE) 50 MG tablet Take 1 tablet (50 mg total) by mouth 2 (two) times a day. 180 tablet 2     loratadine (CLARITIN) 10 mg tablet Take 1 tablet (10 mg total) by mouth daily. 30 tablet 11     No current facility-administered medications for this visit.        Family History: Reviewed    Physical Exam:    Vitals:    04/11/19 1242   BP: 134/80   Patient Site: Left Arm   Patient Position: Sitting   Cuff Size: Adult Regular   Pulse: 74   SpO2: 98%   Weight: 190 lb 8 oz (86.4 kg)   Height: 5' 7\" (1.702 m)     Wt Readings from Last 3 Encounters:   04/11/19 190 lb 8 oz (86.4 kg)   09/20/18 208 lb (94.3 kg)   09/13/18 204 lb 8 oz (92.8 kg)     Body mass index is 29.84 kg/m .    Constitutional:  Reveals a pleasant female.  Vitals:  Per nursing notes.  HEENT:No cervical LAD, no thyromegaly,  conjunctiva is pink, no scleral icterus, TMs are visualized and normal bl, oropharynx is clear, no exudates, slightly crowded  Cardiac:  Regular rate and rhythm,no murmurs, rubs, or gallops. Legs without edema. Palpation of the radial pulse regular.  Lungs: Clear to auscultation bl.  Respiratory effort normal.  Abdomen:positive BS, soft, nontender, nondistended.  No hepato-splenomagaly  Rheumatologic: Normal joints and nails of the hands.  Neurologic:  Cranial nerves II-XII intact.     Psychiatric: affect appropriate, memory intact.     Data Review:    Analysis and Summary of Old Records (2): yes      Records Requested (1): no      Other History Summarized (from other people in the room) (2): daughter    Radiology Tests Summarized (XRAY/CT/MRI/DXA) (1): no    Labs Reviewed (1): yes    Medicine Tests Reviewed (EKG/ECHO/COLONOSCOPY/EGD) (1): no    Independent Review of EKG or X-RAY (2): no      "

## 2021-05-27 NOTE — TELEPHONE ENCOUNTER
Please see if she is on Lantus only (and what dose) or has started Ozempick in addition to it ( was added by parmD in September). When I saw her last, she was on 20 units of Lantus.    Please ask her to set up a follow up visit to f/u on DM.

## 2021-05-27 NOTE — TELEPHONE ENCOUNTER
RN cannot approve Refill Request    RN can NOT refill this medication med is not covered by policy/route to provider.    Mike Roa, [facility] Triage/Med Refill 4/15/2019    Requested Prescriptions   Pending Prescriptions Disp Refills     semaglutide (OZEMPIC) 0.25 mg or 0.5 mg(2 mg/1.5 mL) PnIj 1.5 mL 3       There is no refill protocol information for this order

## 2021-05-27 NOTE — TELEPHONE ENCOUNTER
Refill Approved    Rx renewed per Medication Renewal Policy. Medication was last renewed on 6/25/18.    Caitlin Wyatt, [facility] Triage/Med Refill 3/25/2019     Requested Prescriptions   Pending Prescriptions Disp Refills     cholecalciferol, vitamin D3, 1,000 unit tablet [Pharmacy Med Name: Vitamin D3 Oral Tablet 1000 UNIT] 270 tablet 1     Sig: TAKE THREE TABLETS (3000 IU) BY MOUTH DAILY    There is no refill protocol information for this order        atorvastatin (LIPITOR) 20 MG tablet [Pharmacy Med Name: Atorvastatin Calcium Oral Tablet 20 MG] 90 tablet 1     Sig: Take 1 tablet (20 mg total) by mouth daily.    Statins Refill Protocol (Hmg CoA Reductase Inhibitors) Passed - 3/25/2019  7:01 AM       Passed - PCP or prescribing provider visit in past 12 months     Last office visit with prescriber/PCP: 8/2/2018 Airam Muhammad MD OR same dept: 8/2/2018 Airam Muhammad MD OR same specialty: 8/2/2018 Airam Muhammad MD  Last physical: Visit date not found Last MTM visit: Visit date not found   Next visit within 3 mo: Visit date not found  Next physical within 3 mo: Visit date not found  Prescriber OR PCP: Airam Muhammad MD  Last diagnosis associated with med order: 1. Hyperlipidemia LDL goal <100  - atorvastatin (LIPITOR) 20 MG tablet [Pharmacy Med Name: Atorvastatin Calcium Oral Tablet 20 MG]; Take 1 tablet (20 mg total) by mouth daily.  Dispense: 90 tablet; Refill: 1    If protocol passes may refill for 12 months if within 3 months of last provider visit (or a total of 15 months).

## 2021-05-28 NOTE — TELEPHONE ENCOUNTER
Discussed via [employer].  Her current A1c is under 6% while on Ozempic 0.25mg weekly and glipizide.  With high risk of low blood sugars I would recommend that she completely discontinue glipizide, and increased dose of Ozempic to 0.5mg weekly as previously directed.  She has confirmed that she demonstrates understanding will do so in early May at her next dose.

## 2021-05-30 NOTE — TELEPHONE ENCOUNTER
RN cannot approve Refill Request    RN can NOT refill this medication medication not on med list.       Caitlin Wyatt, [facility] Triage/Med Refill 6/29/2019    Requested Prescriptions   Pending Prescriptions Disp Refills     glipiZIDE (GLUCOTROL) 10 MG tablet [Pharmacy Med Name: glipiZIDE Oral Tablet 10 MG] 360 tablet 2     Sig: Take 2 tablets (20 mg total) by mouth 2 (two) times a day.       Oral Hypoglycemics Refill Protocol Passed - 6/29/2019  7:04 AM        Passed - Visit with PCP or prescribing provider visit in last 6 months       Last office visit with prescriber/PCP: 4/11/2019 OR same dept: 4/11/2019 Airam Muhammad MD OR same specialty: 4/11/2019 Airam Muhammad MD Last physical: 6/27/2019 Last MTM visit: Visit date not found         Next appt within 3 mo: Visit date not found  Next physical within 3 mo: Visit date not found  Prescriber OR PCP: Airam Muhammad MD  Last diagnosis associated with med order: 1. Essential hypertension  - benazepril (LOTENSIN) 20 MG tablet; Take 1 tablet (20 mg total) by mouth daily.  Dispense: 90 tablet; Refill: 2    2. Type 2 diabetes mellitus with diabetic neuropathy, without long-term current use of insulin (H)  - glipiZIDE (GLUCOTROL) 10 MG tablet [Pharmacy Med Name: glipiZIDE Oral Tablet 10 MG]; Take 2 tablets (20 mg total) by mouth 2 (two) times a day.  Dispense: 360 tablet; Refill: 2     If protocol passes may refill for 12 months if within 3 months of last provider visit (or a total of 15 months).           Passed - A1C in last 6 months     Hemoglobin A1c   Date Value Ref Range Status   06/27/2019 6.4 (H) 3.5 - 6.0 % Final               Passed - Microalbumin in last year      Microalbumin, Random Urine   Date Value Ref Range Status   04/11/2019 153.47 (H) 0.00 - 1.99 mg/dL Final                  Passed - Blood pressure in last year     BP Readings from Last 1 Encounters:   06/27/19 114/66             Passed - Serum creatinine in last year     Creatinine    Date Value Ref Range Status   04/11/2019 2.03 (H) 0.60 - 1.10 mg/dL Final           Signed Prescriptions Disp Refills    benazepril (LOTENSIN) 20 MG tablet 90 tablet 2     Sig: Take 1 tablet (20 mg total) by mouth daily.       Ace Inhibitors Refill Protocol Passed - 6/29/2019  7:04 AM        Passed - PCP or prescribing provider visit in past 12 months       Last office visit with prescriber/PCP: 4/11/2019 Airam Muhammad MD OR same dept: 4/11/2019 Airam Muhammad MD OR same specialty: 4/11/2019 Airam Muhammad MD  Last physical: 6/27/2019 Last MTM visit: Visit date not found   Next visit within 3 mo: Visit date not found  Next physical within 3 mo: Visit date not found  Prescriber OR PCP: Airam Muhammad MD  Last diagnosis associated with med order: 1. Essential hypertension  - benazepril (LOTENSIN) 20 MG tablet; Take 1 tablet (20 mg total) by mouth daily.  Dispense: 90 tablet; Refill: 2    2. Type 2 diabetes mellitus with diabetic neuropathy, without long-term current use of insulin (H)  - glipiZIDE (GLUCOTROL) 10 MG tablet [Pharmacy Med Name: glipiZIDE Oral Tablet 10 MG]; Take 2 tablets (20 mg total) by mouth 2 (two) times a day.  Dispense: 360 tablet; Refill: 2    If protocol passes may refill for 12 months if within 3 months of last provider visit (or a total of 15 months).             Passed - Serum Potassium in past 12 months     Lab Results   Component Value Date    Potassium 4.3 04/11/2019             Passed - Blood pressure filed in past 12 months     BP Readings from Last 1 Encounters:   06/27/19 114/66             Passed - Serum Creatinine in past 12 months     Creatinine   Date Value Ref Range Status   04/11/2019 2.03 (H) 0.60 - 1.10 mg/dL Final

## 2021-05-30 NOTE — PROGRESS NOTES
Assessment and Plan:       1. Routine general medical examination at a [facility] care facility  Patient has fatty breasts and is not due for mammogram until next year.  Breast exam today is normal.  She is up-to-date on colonoscopy.     2. Cough  Appears to be bronchitis on exam.  Will treat with antibiotic and a Medrol Dosepak.  We will check a chest x-ray to make sure it is not a bigger pneumonia.  I recommended that she restarts Claritin.  - doxycycline (MONODOX) 100 MG capsule; Take 1 capsule (100 mg total) by mouth 2 (two) times a day for 10 days.  Dispense: 20 capsule; Refill: 0  - methylPREDNISolone (MEDROL DOSEPACK) 4 mg tablet; follow package directions  Dispense: 21 tablet; Refill: 0  - XR Chest 2 Views  - HM1(CBC and Differential)  - HM1 (CBC with Diff)    3. Essential hypertension  Currently very well controlled.  She will continue Coreg, amlodipine, benazepril and hydralazine  - carvedilol (COREG) 25 MG tablet; Take 1 tablet (25 mg total) by mouth 2 (two) times a day.  Dispense: 180 tablet; Refill: 3  - amLODIPine (NORVASC) 10 MG tablet; Take 1 tablet (10 mg total) by mouth daily.  Dispense: 90 tablet; Refill: 3    4. Type 2 diabetes mellitus with diabetic neuropathy, without long-term current use of insulin (H)  Discussed that I do not want her control to be too tight and I am concerned about possibility of low sugars.  At home her sugars at between 90 and 105.  We will decrease Lantus from 20 to 18 units once she is done with a Medrol Dosepak.  She will continue Ozempic at 0.5 mg a week (due to renal insufficiency I did not want to increase her dose).  She does have appointment with ophthalmologist.  She is on aspirin and Lipitor.  LDL is at goal.  Blood pressure is a goal  - semaglutide (OZEMPIC) 0.25 mg or 0.5 mg(2 mg/1.5 mL) PnIj; Inject 0.5 mg under the skin every 7 days.  Dispense: 1.5 mL; Refill: 3  - Glycosylated Hemoglobin A1c  - Thyroid Stimulating Hormone (TSH)    5. Vaginal  spotting  Discussed that this is abnormal at her age.  Will order transvaginal ultrasound and have her see a gynecologist  - US Pelvis With Transvaginal Non OB; Future  - Ambulatory referral to Gynecology  - HM1(CBC and Differential)  - Thyroid Stimulating Hormone (TSH)  - HM1 (CBC with Diff)     6.  Renal insufficiency.  Stage II.  Creatinine is 2 at baseline.  She did establish care with his nephrologist.    The patient's current medical problems were reviewed.      The following health maintenance schedule was reviewed with the patient and provided in printed form in the after visit summary:   Health Maintenance   Topic Date Due     ADVANCE DIRECTIVES DISCUSSED WITH PATIENT  12/14/1968     INFLUENZA VACCINE RULE BASED (Season Ended) 08/01/2019     DIABETES FOOT EXAM  08/02/2019     DIABETES HEMOGLOBIN A1C  10/11/2019     DIABETES FOLLOW-UP  10/11/2019     DIABETES OPHTHALMOLOGY EXAM  03/29/2020     DIABETES URINE MICROALBUMIN  04/11/2020     FALL RISK ASSESSMENT  06/27/2020     MAMMOGRAM  07/30/2020     DXA SCAN  07/30/2020     COLONOSCOPY  12/23/2021     TD 18+ HE  04/12/2029     PNEUMOCOCCAL POLYSACCHARIDE VACCINE AGE 65 AND OVER  Completed     PNEUMOCOCCAL CONJUGATE VACCINE FOR ADULTS (PCV13 OR PREVNAR)  Completed     ZOSTER VACCINES  Completed        Subjective:   Chief Complaint: Samira Kaur is an 68 y.o. female here for an Annual Wellness visit.     HPI:  Samira has history of type 2 diabetes, CKD, neuropathy, high blood pressure, hyperlipidemia, history of cholecystectomy, appendectomy and uterine fibroids.   She is currently here for wellness exam and address several concerns. She is accompanied by her daughter.    Patient reports that she has been coughing for a week.  She denies any sore throat but did have postnasal drainage.  Currently there is no sinus pain.  She has mild chills but no fever, no shortness of breath.  Appetite has been good, she does not feel sick.  On exam she does have a lot of  mucus trapping and mild bronchitis.  Discussed that we will do a chest x-ray to evaluate for pneumonia and will treat with azithromycin and Medrol Dosepak preemptively.  Also recommended that she restarts Claritin.    Patient does have significant CKD with creatinine of 2 and proteinuria.  She is a diabetic.  She did establish care with his nephrologist yesterday.  She will be having ultrasound of her kidneys done.    Her blood pressure is much better controlled with a high dose of hydralazine at 50 mg twice a day.  She is currently on Coreg 25 mg twice a day, amlodipine 10 mg a day and Benzapril 20 mg a day.    Her sugars are very tightly controlled.  In the past A1c was 5.9 today it was 6.4.  Overall A1c is not reliable because of her renal insufficiency and at home her sugars are usually between 90 and 105.  Overall am concerned that she might develop hypoglycemia and she usually does not have symptoms with that.  Ask you to decrease her Lantus from 20 units to 18 units.  She will continue Ozempic 0.5 mg once a week.    On further questioning it also appears that she has been having brown vaginal discharge.  She has been menopausal.  She denies any edel bleeding.  She does have history of fibroids and that his wife saw that this was normal.  Discussed that it is abnormal at her age.  Will order transvaginal ultrasound and have her see a gynecologist.        Review of Systems: Reviewed, she denies any falls.  She does see ophthalmologist regularly due to her history of diabetes.  No abdominal pain constipation diarrhea blood in the stool.  No urinary problems.  She denies any chest pains.  Please see above.  The rest of the review of systems are negative for all systems.    Patient Care Team:  Airam Muhammad MD as PCP - General (Internal Medicine)  Yasmany Moy, PharmD as Pharmacist (Pharmacist)     Patient Active Problem List   Diagnosis     Type 2 diabetes mellitus with diabetic neuropathy, without  long-term current use of insulin (H)     Hyperlipidemia LDL goal <100     Essential hypertension     Chronic renal impairment, stage 3 (moderate) (H)     Adjustment insomnia     No past medical history on file.   Past Surgical History:   Procedure Laterality Date     APPENDECTOMY       CHOLECYSTECTOMY        Family History   Problem Relation Age of Onset     Anorexia nervosa Mother      Diabetes Father       Social History     Socioeconomic History     Marital status: Single     Spouse name: Not on file     Number of children: Not on file     Years of education: Not on file     Highest education level: Not on file   Occupational History     Not on file   Social Needs     Financial resource strain: Not on file     Food insecurity:     Worry: Not on file     Inability: Not on file     Transportation needs:     Medical: Not on file     Non-medical: Not on file   Tobacco Use     Smoking status: Former Smoker     Years: 10.00     Smokeless tobacco: Never Used   Substance and Sexual Activity     Alcohol use: Yes     Comment: ocassional     Drug use: Not on file     Sexual activity: Not on file   Lifestyle     Physical activity:     Days per week: Not on file     Minutes per session: Not on file     Stress: Not on file   Relationships     Social connections:     Talks on phone: Not on file     Gets together: Not on file     Attends Confucianism service: Not on file     Active member of club or organization: Not on file     Attends meetings of clubs or organizations: Not on file     Relationship status: Not on file     Intimate partner violence:     Fear of current or ex partner: Not on file     Emotionally abused: Not on file     Physically abused: Not on file     Forced sexual activity: Not on file   Other Topics Concern     Not on file   Social History Narrative    Pt moved from [place] to [place] in 2018. 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 Travels a lot.      Current Outpatient Medications   Medication Sig  "Dispense Refill     aspirin 81 MG EC tablet Take 1 tablet (81 mg total) by mouth daily.  0     atorvastatin (LIPITOR) 20 MG tablet Take 1 tablet (20 mg total) by mouth daily. 90 tablet 1     benazepril (LOTENSIN) 20 MG tablet Take 1 tablet (20 mg total) by mouth daily. 90 tablet 3     blood glucose meter (GLUCOMETER) Use 1 each As Directed 2 (two) times a day. Dispense glucometer brand per patient's insurance at pharmacy discretion. 1 each 0     blood glucose test strips Use 1 each As Directed 3 (three) times a day before meals. Dispense brand per patient's insurance at pharmacy discretion. 100 strip 11     cholecalciferol, vitamin D3, 1,000 unit tablet TAKE THREE TABLETS (3000 IU) BY MOUTH DAILY 270 tablet 1     fluticasone propionate (FLONASE ALLERGY RELIEF) 50 mcg/actuation nasal spray 1 spray into each nostril daily. 16 g 2     generic lancets Use 1 each As Directed 2 (two) times a day. Dispense brand per patient's insurance at pharmacy discretion. 100 each 11     hydrALAZINE (APRESOLINE) 50 MG tablet Take 1 tablet (50 mg total) by mouth 2 (two) times a day. 180 tablet 2     insulin glargine (LANTUS SOLOSTAR U-100 INSULIN) 100 unit/mL (3 mL) pen Inject 20 units at bed time 9 mL 11     loratadine (CLARITIN) 10 mg tablet Take 1 tablet (10 mg total) by mouth daily. 30 tablet 11     pen needle, diabetic (ULTICARE PEN NEEDLE) 32 gauge x 5/32\" Ndle Use 1 each As Directed daily. With lantus 100 each 3     semaglutide (OZEMPIC) 0.25 mg or 0.5 mg(2 mg/1.5 mL) PnIj Inject 0.5 mg under the skin every 7 days. 1.5 mL 3     amLODIPine (NORVASC) 10 MG tablet Take 1 tablet (10 mg total) by mouth daily. 90 tablet 3     carvedilol (COREG) 25 MG tablet Take 1 tablet (25 mg total) by mouth 2 (two) times a day. 180 tablet 3     doxycycline (MONODOX) 100 MG capsule Take 1 capsule (100 mg total) by mouth 2 (two) times a day for 10 days. 20 capsule 0     methylPREDNISolone (MEDROL DOSEPACK) 4 mg tablet follow package directions 21 " "tablet 0     No current facility-administered medications for this visit.       Objective:   Vital Signs:   Visit Vitals  /66 (Patient Site: Left Arm, Patient Position: Sitting, Cuff Size: Adult Regular)   Pulse 80   Ht 5' 6.75\" (1.695 m)   Wt 189 lb 3.2 oz (85.8 kg)   SpO2 96%   BMI 29.86 kg/m         VisionScreening:  She sees ophthalmologist yearly    PHYSICAL EXAM  General: well appearing female, alert and oriented x3  EYES: Eyelids, conjunctiva, and sclera were normal. Pupils were normal.   HEAD, EARS, NOSE, MOUTH, AND THROAT: no cervical LAD, no thyromegaly or nodules appreciated. TMs are visualized and normal, oropharynx is clear.  No sinus tenderness to palpation.  RESPIRATORY: respirations non labored, CTA bl, no wheezes, rales, however she has mucus trapping when she coughs and mild bronchitis.  CARDIOVASCULAR: Heart rate and rhythm were normal. No murmurs, rubs,gallops. There was no peripheral edema. No carotid bruits.  GASTROINTESTINAL: Positive bowel sounds, abdomen is soft, non tender, non distended.     MUSCULOSKELETAL: Muscle mass was normal for age. No joint synovitis or deformity.  LYMPHATIC: There were no enlarged nodes palpable.  SKIN/HAIR/NAILS: Skin color was normal.  No rashes.  NEUROLOGIC: The patient was alert and oriented.  Speech was normal.  There is no facial asymmetry.   PSYCHIATRIC:  Mood and affect were normal.   Breast exam: Perez lymphadenopathy, breast masses or skin changes appreciated.      Assessment Results 6/27/2019   Activities of Daily Living No help needed   Instrumental Activities of Daily Living No help needed   Get Up and Go Score Less than 12 seconds   Mini Cog Total Score 3     A Mini-Cog score of 0-2 suggests the possibility of dementia, score of 3-5 suggests no dementia    Identified Health Risks:     She is at risk for lack of exercise and has been provided with information to increase physical activity for the benefit of her well-being.  The patient reports " that she does not have all recommended working emergency equipment available. She was provided with information about emergency preparedness, including smoke detectors.  Information regarding advance directives (living you), including where she can download the appropriate form, was provided to the patient via the AVS.

## 2021-05-30 NOTE — TELEPHONE ENCOUNTER
Medication Question or Clarification  Who is calling: Pharmacy: [facility]  What medication are you calling about? (include dose and sig)   blood glucose test (ONETOUCH VERIO) strips 100 strip 3 6/30/2019     Sig - Route: Use 1 each As Directed 3 (three) times a day before meals. - Miscellaneous    Sent to pharmacy as: blood glucose test (ONETOUCH VERIO) strips    E-Prescribing Status: Receipt confirmed by pharmacy (6/30/2019  9:45 PM CDT)        Who prescribed the medication?: Airam Muhammad MD    What is your question/concern?: Fax received from pharmacy, the above brand of diabetic supplies are not covered by the insurance plan.    Please send new RX for Meter, Test Strips, and Lancets for one of the covered brands    Covered Brands: Accu-Chek    Medicare guidelines have been updated as of 1/1/2019 and all new scripts must be submitted for diabetic supplies. Each script must list the ICD-10 code and specific directions for use. Also, it must state if the patient is insulin or non-insulin dependent.     *Please note: for non-insulin dependent patients, Medicare Part B will only cover for once daily testing AND for insulin dependent patients, Medicare Part B will only cover for 3 times daily testing*       Pharmacy: [facility] [place]  Okay to leave a detailed message?: No  Site CMT - Please call the pharmacy to obtain any additional needed information.

## 2021-05-30 NOTE — TELEPHONE ENCOUNTER
Refill Approved    Rx renewed per Medication Renewal Policy. Medication was last renewed on 4/11/2019 with 11 refills.   Change in pharmacy noted.   Last office visit: 6/27/2019 with PCP Dr GIA Smith, [facility] Triage/Med Refill 6/30/2019     Requested Prescriptions   Pending Prescriptions Disp Refills     ONETOUCH VERIO strips [Pharmacy Med Name: ONE TOUCH VERIO TEST ST(NEW)100'S] 100 strip 0     Sig: TEST TWICE DAILY AS DIRECTED       Diabetic Supplies Refill Protocol Passed - 6/30/2019  3:41 AM        Passed - Visit with PCP or prescribing provider visit in last 6 months     Last office visit with prescriber/PCP: 4/11/2019 Airam Muhammad MD OR same dept: 4/11/2019 Airam Muhammad MD OR same specialty: 4/11/2019 Airam Muhammad MD  Last physical: 6/27/2019 Last MTM visit: Visit date not found   Next visit within 3 mo: Visit date not found  Next physical within 3 mo: Visit date not found  Prescriber OR PCP: Airam Muhammad MD  Last diagnosis associated with med order: 1. Type 2 diabetes mellitus with diabetic neuropathy, without long-term current use of insulin (H)  - ONETOUCH VERIO strips [Pharmacy Med Name: ONE TOUCH VERIO TEST ST(NEW)100'S]; TEST TWICE DAILY AS DIRECTED  Dispense: 100 strip; Refill: 0    If protocol passes may refill for 12 months if within 3 months of last provider visit (or a total of 15 months).             Passed - A1C in last 6 months     Hemoglobin A1c   Date Value Ref Range Status   06/27/2019 6.4 (H) 3.5 - 6.0 % Final

## 2021-05-30 NOTE — TELEPHONE ENCOUNTER
Refill Approved    Rx renewed per Medication Renewal Policy. Medication was last renewed on 6/25/18.    Caitlin Wyatt, [facility] Triage/Med Refill 6/29/2019     Requested Prescriptions   Pending Prescriptions Disp Refills     benazepril (LOTENSIN) 20 MG tablet [Pharmacy Med Name: Benazepril HCl Oral Tablet 20 MG] 90 tablet 2     Sig: Take 1 tablet (20 mg total) by mouth daily.       Ace Inhibitors Refill Protocol Passed - 6/29/2019  7:04 AM        Passed - PCP or prescribing provider visit in past 12 months       Last office visit with prescriber/PCP: 4/11/2019 Airam Muhammad MD OR same dept: 4/11/2019 Airam Muhammad MD OR same specialty: 4/11/2019 Airam Muhammad MD  Last physical: 6/27/2019 Last MTM visit: Visit date not found   Next visit within 3 mo: Visit date not found  Next physical within 3 mo: Visit date not found  Prescriber OR PCP: Airam Muhammad MD  Last diagnosis associated with med order: 1. Essential hypertension  - benazepril (LOTENSIN) 20 MG tablet [Pharmacy Med Name: Benazepril HCl Oral Tablet 20 MG]; Take 1 tablet (20 mg total) by mouth daily.  Dispense: 90 tablet; Refill: 2    2. Type 2 diabetes mellitus with diabetic neuropathy, without long-term current use of insulin (H)  - glipiZIDE (GLUCOTROL) 10 MG tablet [Pharmacy Med Name: glipiZIDE Oral Tablet 10 MG]; Take 2 tablets (20 mg total) by mouth 2 (two) times a day.  Dispense: 360 tablet; Refill: 2    If protocol passes may refill for 12 months if within 3 months of last provider visit (or a total of 15 months).             Passed - Serum Potassium in past 12 months     Lab Results   Component Value Date    Potassium 4.3 04/11/2019             Passed - Blood pressure filed in past 12 months     BP Readings from Last 1 Encounters:   06/27/19 114/66             Passed - Serum Creatinine in past 12 months     Creatinine   Date Value Ref Range Status   04/11/2019 2.03 (H) 0.60 - 1.10 mg/dL Final             glipiZIDE  (GLUCOTROL) 10 MG tablet [Pharmacy Med Name: glipiZIDE Oral Tablet 10 MG] 360 tablet 2     Sig: Take 2 tablets (20 mg total) by mouth 2 (two) times a day.       Oral Hypoglycemics Refill Protocol Passed - 6/29/2019  7:04 AM        Passed - Visit with PCP or prescribing provider visit in last 6 months       Last office visit with prescriber/PCP: 4/11/2019 OR same dept: 4/11/2019 Airam Muhammad MD OR same specialty: 4/11/2019 Airam Muhammad MD Last physical: 6/27/2019 Last MTM visit: Visit date not found         Next appt within 3 mo: Visit date not found  Next physical within 3 mo: Visit date not found  Prescriber OR PCP: Airam Muhammad MD  Last diagnosis associated with med order: 1. Essential hypertension  - benazepril (LOTENSIN) 20 MG tablet [Pharmacy Med Name: Benazepril HCl Oral Tablet 20 MG]; Take 1 tablet (20 mg total) by mouth daily.  Dispense: 90 tablet; Refill: 2    2. Type 2 diabetes mellitus with diabetic neuropathy, without long-term current use of insulin (H)  - glipiZIDE (GLUCOTROL) 10 MG tablet [Pharmacy Med Name: glipiZIDE Oral Tablet 10 MG]; Take 2 tablets (20 mg total) by mouth 2 (two) times a day.  Dispense: 360 tablet; Refill: 2     If protocol passes may refill for 12 months if within 3 months of last provider visit (or a total of 15 months).           Passed - A1C in last 6 months     Hemoglobin A1c   Date Value Ref Range Status   06/27/2019 6.4 (H) 3.5 - 6.0 % Final               Passed - Microalbumin in last year      Microalbumin, Random Urine   Date Value Ref Range Status   04/11/2019 153.47 (H) 0.00 - 1.99 mg/dL Final                  Passed - Blood pressure in last year     BP Readings from Last 1 Encounters:   06/27/19 114/66             Passed - Serum creatinine in last year     Creatinine   Date Value Ref Range Status   04/11/2019 2.03 (H) 0.60 - 1.10 mg/dL Final

## 2021-05-30 NOTE — TELEPHONE ENCOUNTER
"Refill Approved    Rx renewed per Medication Renewal Policy. Medication was last renewed on 8/2/2018 for 100/3  Last OV 6/27/2019 PE  Rosalee Robles, [facility] Triage/Med Refill 7/9/2019     Requested Prescriptions   Pending Prescriptions Disp Refills     ULTICARE PEN NEEDLE 32 gauge x 5/32\" Ndle [Pharmacy Med Name: UltiCare Micro Pen Needles Miscellaneous 32G X 4 MM] 100 each 2     Sig: Use 1 each As Directed daily With lantus       Diabetic Supplies Refill Protocol Passed - 7/8/2019 10:39 AM        Passed - Visit with PCP or prescribing provider visit in last 6 months     Last office visit with prescriber/PCP: 4/11/2019 Airam Muhammad MD OR same dept: 4/11/2019 Airam Muhammad MD OR same specialty: 4/11/2019 Airam Muhammad MD  Last physical: 6/27/2019 Last MTM visit: Visit date not found   Next visit within 3 mo: Visit date not found  Next physical within 3 mo: Visit date not found  Prescriber OR PCP: Airam Muhammad MD  Last diagnosis associated with med order: 1. Type 2 diabetes mellitus with diabetic neuropathy, without long-term current use of insulin (H)  - ULTICARE PEN NEEDLE 32 gauge x 5/32\" Ndle [Pharmacy Med Name: UltiCare Micro Pen Needles Miscellaneous 32G X 4 MM]; Use 1 each As Directed daily With lantus  Dispense: 100 each; Refill: 2    If protocol passes may refill for 12 months if within 3 months of last provider visit (or a total of 15 months).             Passed - A1C in last 6 months     Hemoglobin A1c   Date Value Ref Range Status   06/27/2019 6.4 (H) 3.5 - 6.0 % Final                           "

## 2021-05-31 NOTE — TELEPHONE ENCOUNTER
RN cannot approve Refill Request    RN can NOT refill this medication med is not covered by policy/route to provider. Last office visit: 4/11/2019 Airam Muhammad MD Last Physical: 6/27/2019 Last MTM visit: Visit date not found Last visit same specialty: 4/11/2019 Airam Muhammad MD.  Next visit within 3 mo: Visit date not found  Next physical within 3 mo: Visit date not found      Lily Smith, [facility] Triage/Med Refill 8/18/2019    Requested Prescriptions   Pending Prescriptions Disp Refills     semaglutide (OZEMPIC) 0.25 mg or 0.5 mg(2 mg/1.5 mL) PnIj 4.5 mL 3     Sig: Inject 0.5 mg under the skin every 7 days.       There is no refill protocol information for this order

## 2021-05-31 NOTE — TELEPHONE ENCOUNTER
RN cannot approve Refill Request    RN can NOT refill this medication med is not covered by policy/route to provider. Last office visit: 4/11/2019 Airam Muhammad MD Last Physical: 6/27/2019 Last MTM visit: Visit date not found Last visit same specialty: 4/11/2019 Airam Muhammad MD.  Next visit within 3 mo: Visit date not found  Next physical within 3 mo: Visit date not found      Elza Leon, [facility] Triage/Med Refill 8/7/2019    Requested Prescriptions   Pending Prescriptions Disp Refills     semaglutide (OZEMPIC) 0.25 mg or 0.5 mg(2 mg/1.5 mL) PnIj 1.5 mL 11     Sig: Inject 0.5 mg under the skin every 7 days.       There is no refill protocol information for this order

## 2021-06-01 VITALS — WEIGHT: 203 LBS | BODY MASS INDEX: 31.79 KG/M2

## 2021-06-01 VITALS — BODY MASS INDEX: 31.92 KG/M2 | WEIGHT: 203.8 LBS

## 2021-06-01 VITALS — WEIGHT: 194 LBS | BODY MASS INDEX: 30.38 KG/M2

## 2021-06-01 VITALS — BODY MASS INDEX: 30.45 KG/M2 | WEIGHT: 194 LBS | HEIGHT: 67 IN

## 2021-06-01 VITALS — BODY MASS INDEX: 31.28 KG/M2 | WEIGHT: 199.7 LBS

## 2021-06-02 VITALS — HEIGHT: 67 IN | WEIGHT: 190.5 LBS | BODY MASS INDEX: 29.9 KG/M2

## 2021-06-02 VITALS — BODY MASS INDEX: 32.65 KG/M2 | HEIGHT: 67 IN | WEIGHT: 208 LBS

## 2021-06-02 VITALS — WEIGHT: 204.5 LBS | BODY MASS INDEX: 32.03 KG/M2

## 2021-06-02 NOTE — TELEPHONE ENCOUNTER
Refill Approved    Rx renewed per Medication Renewal Policy. Medication was last renewed on 3/25/19.4/11/19    Caitlin Wyatt, [facility] Triage/Med Refill 10/9/2019     Requested Prescriptions   Pending Prescriptions Disp Refills     fluticasone propionate (FLONASE) 50 mcg/actuation nasal spray [Pharmacy Med Name: FLUTICASONE PROPIONATE 50 MCG/ACT Suspension] 32 g 1     Sig: USE 1 SPRAY IN EACH NOSTRIL EVERY DAY       Nasal Steroid Refill Protocol Passed - 10/7/2019  2:49 PM        Passed - Patient has had office visit/physical in last 2 years     Last office visit with prescriber/PCP: 4/11/2019 OR same dept: 4/11/2019 Airam Muhammad MD OR same specialty: 4/11/2019 Airam Muhammad MD Last physical: 6/27/2019 Last MTM visit: Visit date not found    Next appt within 3 mo: Visit date not found  Next physical within 3 mo: Visit date not found  Prescriber OR PCP: Airam Muhammad MD  Last diagnosis associated with med order: 1. Seasonal allergic rhinitis due to pollen  - fluticasone propionate (FLONASE) 50 mcg/actuation nasal spray [Pharmacy Med Name: FLUTICASONE PROPIONATE 50 MCG/ACT Suspension]; USE 1 SPRAY IN EACH NOSTRIL EVERY DAY  Dispense: 32 g; Refill: 1    2. Hyperlipidemia LDL goal <100  - atorvastatin (LIPITOR) 20 MG tablet [Pharmacy Med Name: ATORVASTATIN CALCIUM 20 MG Tablet]; TAKE 1 TABLET EVERY DAY  Dispense: 90 tablet; Refill: 0     If protocol passes may refill for 12 months if within 3 months of last provider visit (or a total of 15 months).              atorvastatin (LIPITOR) 20 MG tablet [Pharmacy Med Name: ATORVASTATIN CALCIUM 20 MG Tablet] 90 tablet 0     Sig: TAKE 1 TABLET EVERY DAY       Statins Refill Protocol (Hmg CoA Reductase Inhibitors) Passed - 10/7/2019  2:49 PM        Passed - PCP or prescribing provider visit in past 12 months      Last office visit with prescriber/PCP: 4/11/2019 Airam Muhammad MD OR same dept: 4/11/2019 Airam Muhammad MD OR same specialty:  4/11/2019 Airam Muhammad MD  Last physical: 6/27/2019 Last MTM visit: Visit date not found   Next visit within 3 mo: Visit date not found  Next physical within 3 mo: Visit date not found  Prescriber OR PCP: Airam Muhammad MD  Last diagnosis associated with med order: 1. Seasonal allergic rhinitis due to pollen  - fluticasone propionate (FLONASE) 50 mcg/actuation nasal spray [Pharmacy Med Name: FLUTICASONE PROPIONATE 50 MCG/ACT Suspension]; USE 1 SPRAY IN EACH NOSTRIL EVERY DAY  Dispense: 32 g; Refill: 1    2. Hyperlipidemia LDL goal <100  - atorvastatin (LIPITOR) 20 MG tablet [Pharmacy Med Name: ATORVASTATIN CALCIUM 20 MG Tablet]; TAKE 1 TABLET EVERY DAY  Dispense: 90 tablet; Refill: 0    If protocol passes may refill for 12 months if within 3 months of last provider visit (or a total of 15 months).

## 2021-06-03 VITALS — WEIGHT: 189.2 LBS | HEIGHT: 67 IN | BODY MASS INDEX: 29.7 KG/M2

## 2021-06-05 NOTE — TELEPHONE ENCOUNTER
RN cannot approve Refill Request    RN can NOT refill this medication Protocol failed and NO refill given. Last office visit: 4/11/2019 Airam Muhammad MD Last Physical: 6/27/2019 Last MTM visit: Visit date not found Last visit same specialty: 4/11/2019 Airam Muhammad MD.  Next visit within 3 mo: Visit date not found  Next physical within 3 mo: Visit date not found      Rachael Marte, [facility] Triage/Med Refill 1/25/2020    Requested Prescriptions   Pending Prescriptions Disp Refills     benazepriL (LOTENSIN) 20 MG tablet [Pharmacy Med Name: BENAZEPRIL HYDROCHLORIDE 20 MG Tablet] 90 tablet 2     Sig: TAKE 1 TABLET EVERY DAY       Ace Inhibitors Refill Protocol Passed - 1/25/2020  1:13 AM        Passed - PCP or prescribing provider visit in past 12 months       Last office visit with prescriber/PCP: 4/11/2019 Airam Muhammad MD OR same dept: 4/11/2019 Airam Muhammad MD OR same specialty: 4/11/2019 Airam Muhammad MD  Last physical: 6/27/2019 Last MTM visit: Visit date not found   Next visit within 3 mo: Visit date not found  Next physical within 3 mo: Visit date not found  Prescriber OR PCP: Airam Muhammad MD  Last diagnosis associated with med order: 1. Essential hypertension  - benazepriL (LOTENSIN) 20 MG tablet [Pharmacy Med Name: BENAZEPRIL HYDROCHLORIDE 20 MG Tablet]; TAKE 1 TABLET EVERY DAY  Dispense: 90 tablet; Refill: 2  - hydrALAZINE (APRESOLINE) 50 MG tablet [Pharmacy Med Name: HYDRALAZINE HYDROCHLORIDE 50 MG Tablet]; TAKE 1 TABLET TWICE DAILY  Dispense: 180 tablet; Refill: 2    If protocol passes may refill for 12 months if within 3 months of last provider visit (or a total of 15 months).             Passed - Serum Potassium in past 12 months     Lab Results   Component Value Date    Potassium 4.3 04/11/2019             Passed - Blood pressure filed in past 12 months     BP Readings from Last 1 Encounters:   06/27/19 114/66             Passed - Serum Creatinine in past 12  "months     Creatinine   Date Value Ref Range Status   04/11/2019 2.03 (H) 0.60 - 1.10 mg/dL Final             hydrALAZINE (APRESOLINE) 50 MG tablet [Pharmacy Med Name: HYDRALAZINE HYDROCHLORIDE 50 MG Tablet] 180 tablet 2     Sig: TAKE 1 TABLET TWICE DAILY       Clonidine/Hydralazine Refill Protocol Passed - 1/25/2020  1:13 AM        Passed - PCP or prescribing provider visit in past 12 months       Last office visit with prescriber/PCP: 4/11/2019 Airam Muhammad MD OR same dept: 4/11/2019 Airam Muhammad MD OR same specialty: 4/11/2019 Airam Muhammad MD  Last physical: 6/27/2019 Last MTM visit: Visit date not found   Next visit within 3 mo: Visit date not found  Next physical within 3 mo: Visit date not found  Prescriber OR PCP: Airam Muhammad MD  Last diagnosis associated with med order: 1. Essential hypertension  - benazepriL (LOTENSIN) 20 MG tablet [Pharmacy Med Name: BENAZEPRIL HYDROCHLORIDE 20 MG Tablet]; TAKE 1 TABLET EVERY DAY  Dispense: 90 tablet; Refill: 2  - hydrALAZINE (APRESOLINE) 50 MG tablet [Pharmacy Med Name: HYDRALAZINE HYDROCHLORIDE 50 MG Tablet]; TAKE 1 TABLET TWICE DAILY  Dispense: 180 tablet; Refill: 2    If protocol passes may refill for 12 months if within 3 months of last provider visit (or a total of 15 months).             Passed - Blood pressure filed in past 12 months     BP Readings from Last 1 Encounters:   06/27/19 114/66             DROPLET PEN NEEDLE 32 gauge x 5/32\" Ndle [Pharmacy Med Name: DROPLET PEN NEEDLES 18IP4RW 32G X 4 MM  ] 100 each 0     Sig: USE  1  DAILY AS DIRECTED  FOR  LANTUS       Diabetic Supplies Refill Protocol Failed - 1/25/2020  1:13 AM        Failed - Visit with PCP or prescribing provider visit in last 6 months     Last office visit with prescriber/PCP: 4/11/2019 Airam Muhammad MD OR same dept: 4/11/2019 Airam Muhammad MD OR same specialty: 4/11/2019 Airam Muhammad MD  Last physical: 6/27/2019 Last MTM visit: Visit date not found "   Next visit within 3 mo: Visit date not found  Next physical within 3 mo: Visit date not found  Prescriber OR PCP: Airam Muhammad MD  Last diagnosis associated with med order: 1. Essential hypertension  - benazepriL (LOTENSIN) 20 MG tablet [Pharmacy Med Name: BENAZEPRIL HYDROCHLORIDE 20 MG Tablet]; TAKE 1 TABLET EVERY DAY  Dispense: 90 tablet; Refill: 2  - hydrALAZINE (APRESOLINE) 50 MG tablet [Pharmacy Med Name: HYDRALAZINE HYDROCHLORIDE 50 MG Tablet]; TAKE 1 TABLET TWICE DAILY  Dispense: 180 tablet; Refill: 2    If protocol passes may refill for 12 months if within 3 months of last provider visit (or a total of 15 months).             Failed - A1C in last 6 months     Hemoglobin A1c   Date Value Ref Range Status   06/27/2019 6.4 (H) 3.5 - 6.0 % Final

## 2021-06-10 NOTE — TELEPHONE ENCOUNTER
RN cannot approve Refill Request    RN can NOT refill this medication Protocol failed and NO refill given. Last office visit: 4/11/2019 Airam Muhammad MD Last Physical: 6/27/2019 Last MTM visit: Visit date not found Last visit same specialty: 4/11/2019 Airam Muhammad MD.  Next visit within 3 mo: Visit date not found  Next physical within 3 mo: Visit date not found      Gail Ziegler, [facility] Triage/Med Refill 8/12/2020    Requested Prescriptions   Pending Prescriptions Disp Refills     OZEMPIC 0.25 mg or 0.5 mg(2 mg/1.5 mL) PnIj [Pharmacy Med Name: OZEMPIC 2 MG/1.5ML Solution Pen-injector]  0     Sig: INJECT 0.5 MG UNDER THE SKIN EVERY 7 DAYS..       There is no refill protocol information for this order

## 2021-06-16 PROBLEM — F51.02 ADJUSTMENT INSOMNIA: Status: ACTIVE | Noted: 2018-06-25

## 2021-06-16 PROBLEM — I10 ESSENTIAL HYPERTENSION: Status: ACTIVE | Noted: 2018-06-25

## 2021-06-16 PROBLEM — E11.40 TYPE 2 DIABETES MELLITUS WITH DIABETIC NEUROPATHY, WITHOUT LONG-TERM CURRENT USE OF INSULIN (H): Status: ACTIVE | Noted: 2018-06-25

## 2021-06-16 PROBLEM — N18.4 CHRONIC RENAL IMPAIRMENT, STAGE 4 (SEVERE) (H): Status: ACTIVE | Noted: 2019-06-27

## 2021-06-16 PROBLEM — E78.5 HYPERLIPIDEMIA LDL GOAL <100: Status: ACTIVE | Noted: 2018-06-25

## 2021-06-17 NOTE — PATIENT INSTRUCTIONS - HE
Patient Instructions by Airam Muhammad MD at 6/27/2019 12:20 PM     Author: Airam Muhammad MD Service: -- Author Type: Physician    Filed: 6/27/2019  3:59 PM Encounter Date: 6/27/2019 Status: Addendum    : Airam Muhammad MD (Physician)    Related Notes: Original Note by Airam Muhammad MD (Physician) filed at 6/27/2019 12:55 PM       1. Decrease Lantus to 18 units (Goal sugars 100-130)    2. Re-start Claritin to help with cough and nasal drainage.    3. Cough is due to bronchitis, inflammation and possible infection in airway. I am ordering antibiotic for you to take and steroid pack. Will check chest XR to make sure there is not pneumonia.    4. See Gynecologist due to vaginal discharge and have transvaginal US done.    5. Blood work check today  Patient Education     Exercise for a Healthier Heart  You may wonder how you can improve the health of your heart. If youre thinking about exercise, youre on the right track. You dont need to become an athlete, but you do need a certain amount of brisk exercise to help strengthen your heart. If you have been diagnosed with a heart condition, your doctor may recommend exercise to help stabilize your condition. To help make exercise a habit, choose safe, fun activities.       Be sure to check with your health care provider before starting an exercise program.    Why exercise?  Exercising regularly offers many healthy rewards. It can help you do all of the following:    Improve your blood cholesterol levels to help prevent further heart trouble    Lower your blood pressure to help prevent a stroke or heart attack    Control diabetes, or reduce your risk of getting this disease    Improve your heart and lung function    Reach and maintain a healthy weight    Make your muscles stronger and more limber so you can stay active    Prevent falls and fractures by slowing the loss of bone mass (osteoporosis)    Manage stress better  Exercise tips  Ease into  your routine. Set small goals. Then build on them.  Exercise on most days. Aim for a total of 150 or more minutes of moderate to  vigorous intensity activity each week. Consider 40 minutes, 3 to 4 times a week. For best results, activity should last for 40 minutes on average. It is OK to work up to the 40 minute period over time. Examples of moderate-intensity activity is walking one mile in 15 minutes or 30 to 45 minutes of yard work.  Step up your daily activity level. Along with your exercise program, try being more active throughout the day. Walk instead of drive. Do more household tasks or yard work.  Choose one or more activities you enjoy. Walking is one of the easiest things you can do. You can also try swimming, riding a bike, or taking an exercise class.  Stop exercising and call your doctor if you:    Have chest pain or feel dizzy or lightheaded    Feel burning, tightness, pressure, or heaviness in your chest, neck, shoulders, back, or arms    Have unusual shortness of breath    Have increased joint or muscle pain    Have palpitations or an irregular heartbeat      4819-9357 [employer]. [address] [place] [address]. All rights reserved. This information is not intended as a substitute for professional medical care. Always follow your healthcare professional's instructions.         Patient Education    Home Fire Safety  Each year, thousands of people, including children, are injured and killed in home fires. Children are often curious about fire, and may not understand the dangers. This makes home fire safety practices especially important. Three important things you can do to keep your home safe from fire are:    Install smoke alarms in your home and make sure they work properly.    Teach children not to play with matches, lighters, and other materials that can be used to start fires. And keep these materials out of childrens reach.    Teach children what to do in case of fire.  Create a fire safety action plan and practice it.  Read on for more details about keeping your family and home safe from fire.        Being Prepared for a Fire  A home fire can happen at any time. The following can help you be prepared:    Install smoke alarms on every level of your home, including the basement and outside all sleeping areas. This simple step cuts your familys risk of dying in a fire nearly in half.    Test smoke alarms monthly, and change the batteries once a year or when the alarm chirps.    Dont disable smoke alarms, even for a short time.    Ask your local fire department for tips on where to place smoke alarms in your home.    Replace all smoke alarms every 10 years.    Consider using voice smoke alarms. These alarms allow you to substitute your own voice for the alarm sound. They are helpful because many children dont wake up to the sound of a regular smoke alarm.    Install carbon monoxide detectors near sleeping areas.    Be aware that carbon monoxide is a byproduct of smoke that can be deadly. Its a gas that you cant see, smell, or taste.    Consider buying a combination smoke alarm / carbon monoxide detector.    Keep fire extinguishers in the home.    Keep them in accessible locations, especially in the kitchen.    Check usage dates to make sure they are not .    Use fire extinguishers only when the fire is in a contained area and is not spreading. (Otherwise, you should focus on getting out of the home.)    Train adults to use fire extinguishers. (Children should focus on getting out of the home during a fire.)    If you live in an apartment, talk to your landlord about where smoke alarms are and how often they are tested. Also ask about fire extinguisher locations and emergency exit routes.  Indoor Fire Safety  Many things in your home are potential fire hazards. Follow these steps to help keep your home safe.    Be careful in the kitchen.    Never leave food thats cooking  unattended.    If a fire breaks out in a cooking pan, put a lid on it to smother it. And never throw water on a grease fire. It will make the fire worse.    Conduct a home safety inspection. Look for anything, such as frayed wires and cords, that can cause a fire. Fix or remove any fire safety hazards you find.    Keep all matches and lighters in a secured drawer or cabinet out of the reach of children. Use childproof lighters.    Check to make sure all appliances, including the stove, are turned off before leaving the home.    Know where the gas main shut-off is located.    Make sure space heaters are stable and have protective covers. Keep them at least 3 feet from anything that can burn, such as curtains. Dont use space heaters in areas where young kids spend time alone.    Keep flammable liquids such as kerosene and gasoline locked up and safely stored away from kids and heat.    Keep all smoking materials out of reach of children. And never smoke in bed. If possible, smoke outdoors only.  Outdoor Fire Safety  Fire can be a hazard outdoors as well as indoors. When outdoors, be sure to do the following:    Always supervise kids near a barbecue grill, campfire, or portable stove.    Dont use fire pits around children. Kids can fall into them, and pits can be hot even after the fire goes out.    Keep a garden hose or fire extinguisher handy when cooking outdoors in case of fire.  Teaching Your Child About Fire Safety  One of the best ways to keep your home safe from fire is education. Make sure everyone in your family knows fire safety rules, including children.    Teach your children the dangers of matches, lighters, and other dangerous items.    Teach them to never touch these and other objects that are hot, such as candles.    Have them tell you right away whenever they find matches or lighters. Explain that these items are tools for grown-ups, not toys. And never amuse children with matches or  lighters.    Round up all matches and lighters and store them safely. In case you missed some, ask your children to tell you where any are located throughout your home.    Never leave a child alone in a room with a lit candle. Dont allow teens to have candles in their rooms.    Show children what to do in case of fire.    Be sure your kids know what the fire alarm sounds like and what to do if it goes off.    Teach kids what to do if their clothes catch fire: Stop, Drop to the ground, and Roll until the fire is put out. They should also cover their face with their hands. Practice these steps with your children. Make sure they understand that running will make the fire burn faster.    Show children how to crawl below smoke during a fire.    Make sure kids know at least two escape routes from each room in the home. These escape routes can be windows.    Teach kids to test doors for nearby fire by feeling for heat with the back of their hand. If the door is warm or hot, they should try their second exit.    Explain to children that they cant hide from a fire. Hiding in a closet or under a bed wont make them safe. Instead, they should try to escape the home. And if they cant escape, they should let others know they are trapped. They can do this by shutting the door to the room, opening a window, and turning on the lights.    Talk to your local fire department.    Introduce your children to a . Let them know that firefighters will look different when in full protective gear. Tell them to never hide from firefighters, and to follow all directions from firefighters during a fire.    Find out if the fire department has a fire safety program for kids.      Create a Fire Safety Plan  Create a plan for your family to follow in case of a fire. Try making it a family project. Important steps for the plan include leaving the home right away and having a designated meeting place.    Make sure your child understands to  get out and stay out. He or she should get out of the home immediately and not go back in, even if family members or pets are still inside.    Decide on a safe meeting place away from the home for everyone to gather.    Teach children to call 911 or emergency services from a cell phone or neighbors phone. Make sure they know to do this only after they are safely out of the home.    Teach your children the fire safety plan. Practice it and make sure they understand it.    Have fire drills twice a year to keep your children prepared in case of fire.    Visit the National Fire Protection Association web site at www.nfpa.org for more information.      8404-8515 [employer]. [address] [place] [address], [place] [address]. All rights reserved. This information is not intended as a substitute for professional medical care. Always follow your healthcare professional's instructions.         Patient Education   Understanding Advance Care Planning  Advance care planning is the process of deciding ones own future medical care. It helps ensure that if you cant speak for yourself, your wishes can still be carried out. The plan is a series of legal documents that note a persons wishes. The documents vary by state. Advance care planning may be done when a person has a serious illness that is expected to get worse. It may be done before major surgery. And it can help you and your family be prepared in case of a major illness or injury. Advance care planning helps with making decisions at these times.       A health care proxy is a person who acts as the voice of a patient when the patient cant speak for himself or herself. The name of this role varies by state. It may be called a Durable Medical Power of  or Durable Power of  for Healthcare. It may be called an agent, surrogate, or advocate. Or it may be called a representative or decision maker. It is an official duty that is identified by a legal document.  The document also varies by state.    Why Is Advance Care Planning Important?  If a person communicates their healthcare wishes:    They will be given medical care that matches their values and goals.    Their family members will not be forced to make decisions in a crisis with no guidance.  Creating a Plan  Making an advance care plan is often done in 3 steps:    Thinking about ones wishes. To create an advance care plan, you should think about what kind of medical treatment you would want if you lose the ability to communicate. Are there any situations in which you would refuse or stop treatment? Are there therapies you would want or not want? And whom do you want to make decisions for you? There are many places to learn more about how to plan for your care. Ask your doctor or  for resources.    Picking a health care proxy. This means choosing a trusted person to speak for you only when you cant speak for yourself. When you cannot make medical decisions, your proxy makes sure the instructions in your advance care plan are followed. A proxy does not make decisions based on his or her own opinions. They must put aside those opinions and values if needed, and carry out your wishes.    Filling out the legal documents. There are several kinds of legal documents for advance care planning. Each one tells health care providers your wishes. The documents may vary by state. They must be signed and may need to be witnessed or notarized. You can cancel or change them whenever you wish. Depending on your state, the documents may include a Healthcare Proxy form, Living Will, Durable Medical Power of , Advance Directive, or others.  The Familys Role  The best help a family can give is to support their loved ones wishes. Open and honest communication is vital. Family should express any concerns they have about the patients choices while the patient can still make decisions.    [id number] [employer],  [employer]. [address] [place] [address]. All rights reserved. This information is not intended as a substitute for professional medical care. Always follow your healthcare professional's instructions.         Also, [facility] offers a free, downloadable health care directive that allows you to share your treatment choices and personal preferences if you cannot communicate your wishes. It also allows you to appoint another person (called a health care agent) to make health care decisions if you are unable to do so. You can download an advance directive by going here: [email or web address]     Patient Education   Personalized Prevention Plan  You are due for the preventive services outlined below.  Your care team is available to assist you in scheduling these services.  If you have already completed any of these items, please share that information with your care team to update in your medical record.  Health Maintenance   Topic Date Due   ? ADVANCE DIRECTIVES DISCUSSED WITH PATIENT  12/14/1968   ? INFLUENZA VACCINE RULE BASED (Season Ended) 08/01/2019   ? DIABETES FOOT EXAM  08/02/2019   ? DIABETES HEMOGLOBIN A1C  10/11/2019   ? DIABETES FOLLOW-UP  10/11/2019   ? DIABETES OPHTHALMOLOGY EXAM  03/29/2020   ? DIABETES URINE MICROALBUMIN  04/11/2020   ? FALL RISK ASSESSMENT  06/27/2020   ? MAMMOGRAM  07/30/2020   ? DXA SCAN  07/30/2020   ? COLONOSCOPY  12/23/2021   ? TD 18+ HE  04/12/2029   ? PNEUMOCOCCAL POLYSACCHARIDE VACCINE AGE 65 AND OVER  Completed   ? PNEUMOCOCCAL CONJUGATE VACCINE FOR ADULTS (PCV13 OR PREVNAR)  Completed   ? ZOSTER VACCINES  Completed

## 2021-06-18 NOTE — PROGRESS NOTES
[facility] Follow Up Note    Assessment/Plan:    1. Type 2 diabetes mellitus with diabetic neuropathy, without long-term current use of insulin (H)  We will check A1c today.  She is on the maximum dose of glipizide.  Metformin cannot be used due to poor GFR.  Discussed need to see an ophthalmologist.  She is on a cholesterol medication and ACE inhibitor.  Discussed aspirin.  - glipiZIDE (GLUCOTROL) 10 MG tablet; Take 2 tablets (20 mg total) by mouth 2 (two) times a day.  Dispense: 360 tablet; Refill: 3  - Glycosylated Hemoglobin A1c  - Lipid Cascade  - Comprehensive Metabolic Panel  - HM1(CBC and Differential)  - Thyroid Stimulating Hormone (TSH)  - Microalbumin, Random Urine  - HM1 (CBC with Diff)  - Ambulatory referral to Ophthalmology    2. Hyperlipidemia LDL goal <100  She is on Lipitor.  Will check fasting blood work today  - atorvastatin (LIPITOR) 20 MG tablet; Take 1 tablet (20 mg total) by mouth daily.  Dispense: 90 tablet; Refill: 2  - Lipid Cascade  - Comprehensive Metabolic Panel  - HM1(CBC and Differential)  - Thyroid Stimulating Hormone (TSH)  - HM1 (CBC with Diff)    3. Essential hypertension  Uncontrolled despite numerous medications.  Patient is visibly anxious.  For now I provided refills and asked her to check her blood pressures at home and bring records in for follow-up.  - benazepril (LOTENSIN) 20 MG tablet; Take 1 tablet (20 mg total) by mouth daily.  Dispense: 90 tablet; Refill: 3  - amLODIPine (NORVASC) 10 MG tablet; Take 1 tablet (10 mg total) by mouth daily.  Dispense: 90 tablet; Refill: 3  - carvedilol (COREG) 25 MG tablet; Take 1 tablet (25 mg total) by mouth 2 (two) times a day.  Dispense: 180 tablet; Refill: 3  - hydrALAZINE (APRESOLINE) 10 MG tablet; Take 1 tablet (10 mg total) by mouth 2 (two) times a day.  Dispense: 180 tablet; Refill: 2  - Lipid Cascade  - Comprehensive Metabolic Panel  - HM1(CBC and Differential)  - Thyroid Stimulating Hormone (TSH)  - HM1 (CBC with  Diff)    4. Chronic renal impairment, stage 3 (moderate)  Creatinine is around 2 at baseline with his proteinuria.  Most likely this is secondary to diabetes which was not treated until 2016.  Will check her renal function today.  She discussed that in the future she may need a referral to nephrology  - Comprehensive Metabolic Panel  - HM1(CBC and Differential)  - Thyroid Stimulating Hormone (TSH)  - HM1 (CBC with Diff)    5. Visit for screening mammogram  - Mammo Screening Bilateral; Future    6. Postmenopausal status  - DXA Bone Density Scan; Future    7.  Preventative care  Mammogram and bone density was ordered.  She will get first pneumococcal vaccine today.  She is 67.  It does not look like she had Pap smear in the last 2 years and previously to that she has not been followed by Dr. garcia.  Will discuss in follow-up when her last Pap smear was then consider doing one.  - Pneumococcal conjugate vaccine 13-valent 6wks-17yrs; >50yrs    8. Vitamin D deficiency  - Vitamin D, Total (25-Hydroxy)    9. Adjustment insomnia  In the future will need to explore possibility of a sleep apnea in the light off uncontrolled hypertension.  - traZODone (DESYREL) 50 MG tablet; Half to one tab nightly as needed  Dispense: 30 tablet; Refill: 2    Airam Muhammad MD    Chief Complaint:  Chief Complaint   Patient presents with     [facility]     Med check       History of Present Illness:  Samira is a 67 y.o. female who has moved from [place] to [place] earlier this year.  She is currently here to establish [facility] and she is accompanied by her daughter.  Patient was in [facility] before and we were able to obtain her medical records through the computer.  She has history of type 2 diabetes, CKD, neuropathy, high blood pressure, hyperlipidemia, history of cholecystectomy, appendectomy and uterine fibroids.  She does not have any concerns or complaints today.    Patient was diagnosed with type 2  diabetes in 2016.  At that time her A1c was 8.2.  She was started on glipizide (I assume metformin was not used due to her renal insufficiency) and currently she is on 20 mg twice a day.  Subsequent A1c a year ago was 7.4.  Patient does check her sugars intermittently and reports that frequently fasting sugars are in 270s or 300s.  Will check her A1c today.  She does have mild neuropathy.  In the past she has had eye exam several years ago and there is no history of retinopathy.  She was noted to have significant renal insufficiency on previous blood work.    Patient creatinine in 2016 was 1.9.  Patient also had significant proteinuria.  She is currently on ACE inhibitor.  Before medical treatment in 2016 she has not been followed by a provider and I suspect that renal insufficiency is due to diabetic complication.  She has not seen a kidney doctor in the past.  Will check her creatinine levels again today.    Patient has history of high blood pressure but no heart disease.  She is currently on several blood pressure medications and blood pressure is still somewhat elevated today.  I refilled her amlodipine, carvedilol, Lotensin and hydralazine.  Asked her to check blood pressures at home and bring records in next time.    Since patient moved here she has been having problems falling asleep due to adjustment anxiety.  She denies depression.  She denies snoring or nasal congestion at night.  Discussed the trial of trazodone.    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 Travels a lot.       Past History: Reviewed  Current Outpatient Prescriptions   Medication Sig Dispense Refill     amLODIPine  "(NORVASC) 10 MG tablet Take 1 tablet (10 mg total) by mouth daily. 90 tablet 3     atorvastatin (LIPITOR) 20 MG tablet Take 1 tablet (20 mg total) by mouth daily. 90 tablet 2     benazepril (LOTENSIN) 20 MG tablet Take 1 tablet (20 mg total) by mouth daily. 90 tablet 3     blood glucose test (ONETOUCH VERIO) strips Use As Directed 2 (two) times a day.       carvedilol (COREG) 25 MG tablet Take 1 tablet (25 mg total) by mouth 2 (two) times a day. 180 tablet 3     glipiZIDE (GLUCOTROL) 10 MG tablet Take 2 tablets (20 mg total) by mouth 2 (two) times a day. 360 tablet 3     hydrALAZINE (APRESOLINE) 10 MG tablet Take 1 tablet (10 mg total) by mouth 2 (two) times a day. 180 tablet 2     lancets (ONETOUCH DELICA LANCETS) 30 gauge Misc Use As Directed 2 (two) times a day.       traZODone (DESYREL) 50 MG tablet Half to one tab nightly as needed 30 tablet 2     No current facility-administered medications for this visit.        Family History: Reviewed    Physical Exam:    Vitals:    06/25/18 1145 06/25/18 1151   BP: (!) 164/98 (!) 154/92   Patient Site: Left Arm Left Arm   Patient Position: Sitting Sitting   Cuff Size: Adult Regular Adult Large   Pulse: 72    Weight: 194 lb (88 kg)    Height: 5' 7\" (1.702 m)      Wt Readings from Last 3 Encounters:   06/25/18 194 lb (88 kg)     Body mass index is 30.38 kg/(m^2).    Constitutional:  Reveals a pleasant female.  Vitals:  Per nursing notes.  HEENT:No cervical LAD, no thyromegaly,  conjunctiva is pink, no scleral icterus, TMs are visualized and normal bl, oropharynx is clear, no exudates,   Cardiac:  Regular rate and rhythm,no murmurs, rubs, or gallops.  Legs without edema. Palpation of the radial pulse regular.  Lungs: Clear to auscultation bl.  Respiratory effort normal.  Abdomen:positive BS, soft, nontender, nondistended.  No hepato-splenomagaly  Skin:   Without rash, bruise, or palpable lesions.  Rheumatologic: Normal joints and nails of the hands.  Neurologic:  Cranial " nerves II-XII intact.     Psychiatric: affect appropriate, memory intact.  Mild anxiety noted  Breast exam: No axilla lymphadenopathy, breast masses or skin changes appreciated.  Lower extremities: Decreased sensation in the right toe compared to the left.  Good DP pulses.      Data Review:    Analysis and Summary of Old Records (2): Yes care everywhere    Records Requested (1): No    Other History Summarized (from other people in the room) (2): Yes daughter    Radiology Tests Summarized (XRAY/CT/MRI/DXA) (1): No    Labs Reviewed (1): Yes   Medicine Tests Reviewed (EKG/ECHO/COLONOSCOPY/EGD) (1): yes colonoscopy and mammogram    Independent Review of EKG or X-RAY (2): No

## 2021-06-19 ENCOUNTER — HEALTH MAINTENANCE LETTER (OUTPATIENT)
Age: 71
End: 2021-06-19

## 2021-06-19 NOTE — PROGRESS NOTES
MTM Follow Up Encounter  Assessment & Plan                                                       1. Type 2 diabetes mellitus with diabetic neuropathy, without long-term current use of insulin (H)  Fasting blood sugars are ultimately at goal, postprandial blood sugars after lunch elevated above goal of 180.  Recommend continue current regimen of Lantus 20 units daily at bedtime, max dose glipizide until follow-up with PCP.  Improvements in diet her blood sugars will continue to improve.  She is also starting to be more active by going on a regular wax and this will likely be beneficial as well.  Recommend reassess A1c at follow-up, approximately 1 month after initiating insulin to ensure that A1c is more than the right direction.  - increase insulin glargine (LANTUS; BASAGLAR) 100 unit/mL (3 mL) pen; Inject 20 Units under the skin at bedtime.  Dispense: 15 mL; Refill: 3  - Glycosylated Hemoglobin A1c; Future  - JIC RED; Future    2. Essential hypertension  Not at goal blood pressure less than 130/80 per JNC 8, due to chronic kidney disease.  I recommend reassess basic metabolic panel after reinitiating all antihypertensives, her home blood pressure cuff is calibrating about 20 mmHg higher than the clinic,.  It is unclear this is white coat syndrome today in clinic, or that her blood pressure cuff is also not very accurate.  No adjustments in antihypertensive today, however discussed that her peripheral edema may be due to high dose amlodipine.  May consider titrating hydralazine dose is higher at follow-up.  Recommend reassess basic metabolic panel to assess electrolytes and renal function at follow-up.  - Basic Metabolic Panel; Future  - JIC RED; Future    3. Hyperlipidemia LDL goal <100  Stable on moderate intensity statin per ACC/AHA lipid guidelines, recommend continue current regimen.    4. Adjustment insomnia  Continues to have difficulty falling asleep.  However she is tolerating trazodone without adverse  "effects.  Recommend to titrate dose to 100 mg before bedtime, to assess if she continues to have good sleep and able to get to sleep faster.  Close follow-up with PCP in 1 week.  Discussed potential adverse effects of higher doses of trazodone.  -Increase dose traZODone (DESYREL) 50 MG tablet; Take 2 tablets (100 mg total) by mouth at bedtime.  Dispense: 60 tablet; Refill: 2      Follow Up  Return in about 1 week (around 8/2/2018) for with pcp .      Subjective & Objective                                                     Samira Kaur is a 67 y.o. female coming in for a follow up visit for Medication Therapy Management. She was referred to me from Airam Muhammad MD. She is now living with her daughter and they are working closely together to work on her medical conditions. They get along very well and have been making large improvements in her health.     Chief Complaint: Diabetes medication management follow up    Medication Adherence/Access: Her daughter created schedule with medications and what they are and how to take them. three times a day meds. Waking up with alarm to take her pills, she takes one nap in the morning after taking her pills, as she does not get to sleep until midnight.     1. Type 2 diabetes mellitus with diabetic neuropathy, without long-term current use of insulin (H)  Sugars have been going \"up and down\", however much better than before. Most fasting sugars are under 140. Post prandial sugars after lunch are in the mid-200's. She continues on max dose glipizide. They have started meal planning and prepping. Yesterday she did this all by herself. Her dietary changes have made a significant change in her blood sugars.  Was also very helpful to meet with the diabetes educator.  Her fasting blood sugars most recently ranged between .  She had gone all the way up to Lantus 24 units daily, however started to have lower blood sugars before they backed off to 20 units daily.  Lab " Results   Component Value Date    HGBA1C >14.0 (H) 06/25/2018     Lab Results   Component Value Date    MICROALBUR 335.44 (H) 06/25/2018    LDLCALC 80 06/25/2018    CREATININE 2.00 (H) 06/25/2018     2. Essential hypertension  Home blood pressures have been well controlled in 130's-120's systolic. She does have some noticeable peripheral edema, discussed potential involvement of medications. She brought in her home blood pressure cuff today for calibration.  It seems as though her blood pressure cuff is about 20 mmHg below our electronic cuff here in clinic.  Notes that she does eat some salt and does not necessarily avoid this.    3. Hyperlipidemia LDL goal <100  Restarted her statin, denies signs or symptoms of adverse effects.  She takes this at bedtime.    4. Adjustment insomnia  Started trazodone, she sleeps deeply but getting to sleep is still difficult. Taking 50mg about an hour before she would like to fall asleep.  Because she wakes up early to take her medications, she finds that she generally takes a morning nap.     PMH: reviewed in EPIC   Allergies/ADRs: reviewed in EPIC   Alcohol: reviewed in EPIC   Tobacco:   History   Smoking Status     Former Smoker     Years: 10.00   Smokeless Tobacco     Never Used     Today's Vitals:   Vitals:    07/26/18 1053   BP: 163/78   Pulse: 64   Weight: 203 lb (92.1 kg)     ----------------    Much or all of the text in this note was generated through the use of Dragon Dictate voice-to-text software. Errors in spelling or words which seem out of context are unintentional. Sound alike errors, in particular, may have escaped editing.    The patient was given a summary of these recommendations as an after visit summary    I spent 30 minutes with this patient today; .. All changes were made via collaborative practice agreement with Airam Muhammad MD. A copy of the visit note was provided to the patient's provider.     Yasmany Moy, PharmD, BCACP  Medication Management  "(MT) Pharmacist  [facility]       Current Outpatient Prescriptions   Medication Sig Dispense Refill     amLODIPine (NORVASC) 10 MG tablet Take 1 tablet (10 mg total) by mouth daily. 90 tablet 3     aspirin 81 MG EC tablet Take 1 tablet (81 mg total) by mouth daily.  0     atorvastatin (LIPITOR) 20 MG tablet Take 1 tablet (20 mg total) by mouth daily. 90 tablet 2     benazepril (LOTENSIN) 20 MG tablet Take 1 tablet (20 mg total) by mouth daily. 90 tablet 3     blood glucose meter (GLUCOMETER) Use 1 each As Directed 2 (two) times a day. Dispense glucometer brand per patient's insurance at pharmacy discretion. 1 each 0     blood glucose test strips Use 1 each As Directed 2 (two) times a day. Dispense brand per patient's insurance at pharmacy discretion. 100 strip 11     carvedilol (COREG) 25 MG tablet Take 1 tablet (25 mg total) by mouth 2 (two) times a day. 180 tablet 3     cholecalciferol, vitamin D3, 3,000 unit Tab One tab a day 90 tablet 2     generic lancets Use 1 each As Directed 2 (two) times a day. Dispense brand per patient's insurance at pharmacy discretion. 100 each 11     glipiZIDE (GLUCOTROL) 10 MG tablet Take 2 tablets (20 mg total) by mouth 2 (two) times a day. 360 tablet 3     hydrALAZINE (APRESOLINE) 10 MG tablet Take 1 tablet (10 mg total) by mouth 2 (two) times a day. 180 tablet 2     pen needle, diabetic (ULTICARE PEN NEEDLE) 32 gauge x 5/32\" Ndle Use 1 each As Directed daily. With lantus 100 each 3     traZODone (DESYREL) 50 MG tablet Take 2 tablets (100 mg total) by mouth at bedtime. 60 tablet 2     insulin glargine (LANTUS; BASAGLAR) 100 unit/mL (3 mL) pen Inject 20 Units under the skin at bedtime. 15 mL 3     No current facility-administered medications for this visit.            "

## 2021-06-19 NOTE — PROGRESS NOTES
"MTM Initial Encounter  Assessment & Plan                                                     1. Type 2 diabetes mellitus with diabetic neuropathy, without long-term current use of insulin (H)  Not at goal A1c less than 7.5% per ADA guidelines.  Due to degree of CKD, her A1c may actually be slightly lower than it would be if she has normal renal function.  I recommend to  adjust medicines based off of daily blood glucose readings.  We will have her start testing blood sugars twice daily, fasting in the morning and 2 hours after lunch which is her largest meal.  Based on her elevated blood sugars while on glipizide, in reality she will likely require basal and bolus insulin over time, however will monitor going forward.  Initiating basal insulin conservatively as she is going out of town for a week, and initiate 10 units daily.  Instructed that they should increase her dose by 2 units daily until fasting sugars are below 160.  Close follow-up with diabetes educator to adjust insulin and educate on diabetes diet.  Subsequent close follow-up with myself and PCP.  Will be stable on aspirin, statin, ACE inhibitor.  - pen needle, diabetic (ULTICARE PEN NEEDLE) 32 gauge x 5/32\" Ndle; Use 1 each As Directed daily. With lantus  Dispense: 100 each; Refill: 3  - insulin glargine (LANTUS; BASAGLAR) 100 unit/mL (3 mL) pen; Inject 10 Units under the skin at bedtime.  Dispense: 5 adj dose pen; Refill: 2  -Glucose meter + supplies per insurance     2. Essential hypertension  Currently off of all of her medications, will reinitiate these and close follow-up of blood pressure at home and in clinic. ACE inhibitor for renal protection due to CKD and diabetes.  Recommend push for lower blood pressure goal less than 130/80 per KDIGO guidelines.    3. Hyperlipidemia LDL goal <100  We will be initiating her moderate intensity statin; at goal per ACC/AHA lipid guidelines.    4. Adjustment insomnia  We will be initiating trazodone, discussed " mechanism of action and best timing of this medication for effective sleep onset.  She demonstrates understanding will try this at home.  Discussed if she gets too foggy the next day she could take 1/2 tablet at bedtime.  I anticipate once her blood sugars are better and she has more energy during the day, she will have less naps and better sleep at night.  -Educated on use of trazodone 1 hour before bed    5. Vitamin D deficiency  Will be initiating vitamin D 3000 units daily.    Follow Up  Return in about 1 month (around 7/28/2018).      Subjective & Objective                                                     Samira Kaur is a 67 y.o. female coming in for an initial visit for Medication Therapy Management. She was referred to me from Airam Muhammad MD. She moved here to [place] with her daughter this year, previously had lived alone in [place].  Notes that when she lived alone in [place] she would eat whatever she wanted including a lot of fast food.  Her daughter was in the [employer] for about 7 years, she has now helping get her mom's health care back in order.  They are going to [place] on vacation for about a week.     Chief Complaint: Diabetes medication management    Medication Adherence/Access: once daily meds in the morning and twice daily meds with lunch and dinner. She does not like taking too many meds at once. She was sick and went off all meds for more than a month most recently.     1. Type 2 diabetes mellitus with diabetic neuropathy, without long-term current use of insulin (H)  Notes that she was feeling weak and sick, and stopped all of her medications for a month.  She is also lost about 20 pounds since moving to [place].  She is starting to be very careful about her diet now that she lives with her daughter.  Her daughter is now coming to all of her medical appointments and learning about how to take care of her mom including diet and medications.  She is going to see the diabetes  educator on July 12.  They have a glucose meter but will need a new meter and supplies going forward.  She was previously on glipizide, most recently when she was on this a month ago she had been checking her sugars and they range from mid 200s up to the 500s.  She is very scared about insulin because she used to see her family member inject themselves when the needles were very large. She does have some signs of neuropathy, with numbness in 2 of her toes.  She is a poor understanding of risks of poorly controlled diabetes over time.  Her daughter has already told her that she may require dialysis if she does not control her blood sugars.    2. Essential hypertension  Again off of all of her antihypertensives, however she is familiar with the names of these medications and will pick them up today and begin them.    3. Hyperlipidemia LDL goal <100  She had previously been on a statin, and tolerated without adverse effects.  Again we will need to restart this.    4. Adjustment insomnia  She is having poor sleep.  She is very tired during the day and not keeping a schedule.  She may wake up at noon, and take naps in the middle of the day.  She is hoping to get some better sleep.    5. Vitamin D deficiency  We will also be starting her vitamin D supplement based on recent lab tests.     PMH: reviewed in EPIC   Allergies/ADRs: reviewed in EPIC   Alcohol: reviewed in EPIC   Tobacco:   History   Smoking Status     Former Smoker     Years: 10.00   Smokeless Tobacco     Never Used     Today's Vitals:   Vitals:    06/28/18 1539   BP: (!) 154/92   Pulse: 72   Weight: 194 lb (88 kg)     ----------------    Much or all of the text in this note was generated through the use of Dragon Dictate voice-to-text software. Errors in spelling or words which seem out of context are unintentional. Sound alike errors, in particular, may have escaped editing.    The patient was given a summary of these recommendations as an after visit  "summary    I spent 60 minutes with this patient today; .. All changes were made via collaborative practice agreement with Airam Muhammad MD. A copy of the visit note was provided to the patient's provider.     Yasmany Moy, PharmD, [employer] [facility]  Medication Management (MTM) Pharmacist  [facility]       Current Outpatient Prescriptions   Medication Sig Dispense Refill     amLODIPine (NORVASC) 10 MG tablet Take 1 tablet (10 mg total) by mouth daily. 90 tablet 3     aspirin 81 MG EC tablet Take 1 tablet (81 mg total) by mouth daily.  0     atorvastatin (LIPITOR) 20 MG tablet Take 1 tablet (20 mg total) by mouth daily. 90 tablet 2     benazepril (LOTENSIN) 20 MG tablet Take 1 tablet (20 mg total) by mouth daily. 90 tablet 3     blood glucose test (ONETOUCH VERIO) strips Use As Directed 2 (two) times a day.       carvedilol (COREG) 25 MG tablet Take 1 tablet (25 mg total) by mouth 2 (two) times a day. 180 tablet 3     cholecalciferol, vitamin D3, 3,000 unit Tab One tab a day 90 tablet 2     glipiZIDE (GLUCOTROL) 10 MG tablet Take 2 tablets (20 mg total) by mouth 2 (two) times a day. 360 tablet 3     hydrALAZINE (APRESOLINE) 10 MG tablet Take 1 tablet (10 mg total) by mouth 2 (two) times a day. 180 tablet 2     insulin glargine (LANTUS; BASAGLAR) 100 unit/mL (3 mL) pen Inject 10 Units under the skin at bedtime. 5 adj dose pen 2     lancets (ONETOUCH DELICA LANCETS) 30 gauge Misc Use As Directed 2 (two) times a day.       pen needle, diabetic (ULTICARE PEN NEEDLE) 32 gauge x 5/32\" Ndle Use 1 each As Directed daily. With lantus 100 each 3     traZODone (DESYREL) 50 MG tablet Half to one tab nightly as needed 30 tablet 2     No current facility-administered medications for this visit.              "

## 2021-06-19 NOTE — PROGRESS NOTES
"[facility] Follow Up Note    Assessment/Plan:    1. Type 2 diabetes mellitus with diabetic neuropathy, without long-term current use of insulin (H)  Sugars are much better on Lantus 20 units a day.  She still has slightly elevated postprandial sugar after lunch.  I asked her to check his sugars before dinner.  She promised to observe diabetic diet better.  She has gained weight since she has been on insulin.  She is currently on glipizide as well.  Discussed the possibility of using Victoza or another injectable medication to help with his diabetes control and weight loss.  Patient was amiable to do 2 shots a day.  I think she would benefit from Victoza and tapering off glipizide giving her renal insufficiency.  I recommended that she follows up with our pharmacist to learn more about Victoza and gradually taper of glipizide and add Victoza to her insulin.  - pen needle, diabetic (ULTICARE PEN NEEDLE) 32 gauge x 5/32\" Ndle; Use 1 each As Directed daily. With lantus  Dispense: 100 each; Refill: 3  - blood glucose test strips; Use 1 each As Directed 2 (two) times a day. Dispense brand per patient's insurance at pharmacy discretion.  Dispense: 100 strip; Refill: 11  - generic lancets; Use 1 each As Directed 2 (two) times a day. Dispense brand per patient's insurance at pharmacy discretion.  Dispense: 100 each; Refill: 11    2. Essential hypertension  Blood pressures less than 130 systolically due to history of advanced renal disease.  Currently she is above goal.  We will increase her hydralazine from 10-25 mg twice a day.  She will continue benazepril, amlodipine and Coreg.  Also strongly encourage her to have sleep apnea testing done due to history of snoring  - hydrALAZINE (APRESOLINE) 25 MG tablet; Take 1 tablet (25 mg total) by mouth 2 (two) times a day.  Dispense: 180 tablet; Refill: 2    3. Elevated hemoglobin (H)  Could be due to dehydration due to extremely high sugars in the past versus sleep " apnea.  Will repeat it again today.  - HM1(CBC and Differential)  - Ambulatory referral to Sleep Medicine  - HM1 (CBC with Diff)    4. Snoring  - Ambulatory referral to Sleep Medicine  - fluticasone (FLONASE ALLERGY RELIEF) 50 mcg/actuation nasal spray; 1 spray into each nostril daily.  Dispense: 16 g; Refill: 2    Airam Muhammad MD    Chief Complaint:  Chief Complaint   Patient presents with     Follow-up       History of Present Illness:  Samira is a 67 y.o. female with history of type 2 diabetes, CKD, neuropathy, high blood pressure, hyperlipidemia, history of cholecystectomy, appendectomy and uterine fibroids.     I saw patient a months ago to establish care.  At that time her A1c was more than 14.  She was on glipizide.  She did see diabetic educator and a pharmacist and was started on insulin.  Currently she is on Lantus 20 units a day.  Because of her renal insufficiency A1c could be misleading.  Her readings at home show morning sugars between 90 and 120 (occasionally it would be 150 due to dietary indiscretion).  After lunch sugars fluctuate a lot and can be in 100s, 150s-200s.  Patient does admit that she needs to stick to the diabetic diet better.  She has not been checking sugar sugars before dinner and I asked her to do so.  She did recently see ophthalmologist and got new glasses.  Since sugars are better controlled her vision is better.  She reports no retinopathy on exam but does have small cataracts.  She is she is on aspirin and cholesterol medication.  With improved diabetes control she is also able to sleep better since she does not have to urinate all the time.  Since patient started on insulin she did gain weight    Patient has stage IV renal insufficiency with creatinine around 2 and proteinuria.  Currently no anemia or acidemia.  Discussed that in the future I will need her to see a nephrologist.  Currently we need to make sure that her sugar and blood pressure is well  controlled.    Patient is on for medication for hypertension including benazepril, amlodipine, Coreg and hydralazine.  In the office today her blood pressure is 135/85.  At home her blood pressure fluctuates between 130 and 140.  Discussed that I would like her blood pressure to be consistently less than 130 due to renal insufficiency.  Today we will increase her hydralazine from 10-25 mg twice a day.  Discussed potential for lower extremity edema due to blood pressure medications.  Patient also has history of snoring I did recommend sleep apnea evaluation.    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 Travels a lot.       Past History: Reviewed  Current Outpatient Prescriptions   Medication Sig Dispense Refill     amLODIPine (NORVASC) 10 MG tablet Take 1 tablet (10 mg total) by mouth daily. 90 tablet 3     aspirin 81 MG EC tablet Take 1 tablet (81 mg total) by mouth daily.  0     atorvastatin (LIPITOR) 20 MG tablet Take 1 tablet (20 mg total) by mouth daily. 90 tablet 2     benazepril (LOTENSIN) 20 MG tablet Take 1 tablet (20 mg total) by mouth daily. 90 tablet 3     blood glucose meter (GLUCOMETER) Use 1 each As Directed 2 (two) times a day. Dispense glucometer brand per patient's insurance at pharmacy discretion. 1 each 0     carvedilol (COREG) 25 MG tablet Take 1 tablet (25 mg total) by mouth 2 (two) times a day. 180 tablet 3     cholecalciferol, vitamin D3, 3,000 unit Tab One tab a day 90 tablet 2     glipiZIDE (GLUCOTROL) 10 MG tablet Take 2 tablets (20 mg total) by mouth 2 (two) times a day. 360 tablet 3     insulin glargine (LANTUS; BASAGLAR) 100 unit/mL (3 mL) pen Inject 20 Units under the  "skin at bedtime. 15 mL 3     traZODone (DESYREL) 50 MG tablet Take 2 tablets (100 mg total) by mouth at bedtime. 60 tablet 2     blood glucose test strips Use 1 each As Directed 2 (two) times a day. Dispense brand per patient's insurance at pharmacy discretion. 100 strip 11     fluticasone (FLONASE ALLERGY RELIEF) 50 mcg/actuation nasal spray 1 spray into each nostril daily. 16 g 2     generic lancets Use 1 each As Directed 2 (two) times a day. Dispense brand per patient's insurance at pharmacy discretion. 100 each 11     hydrALAZINE (APRESOLINE) 25 MG tablet Take 1 tablet (25 mg total) by mouth 2 (two) times a day. 180 tablet 2     pen needle, diabetic (ULTICARE PEN NEEDLE) 32 gauge x 5/32\" Ndle Use 1 each As Directed daily. With lantus 100 each 3     No current facility-administered medications for this visit.        Family History: Reviewed    Physical Exam:    Vitals:    08/02/18 1152   BP: 130/74   Patient Site: Left Arm   Patient Position: Sitting   Cuff Size: Adult Large   Pulse: 64   SpO2: 99%   Weight: 199 lb 11.2 oz (90.6 kg)     Wt Readings from Last 3 Encounters:   08/02/18 199 lb 11.2 oz (90.6 kg)   07/26/18 203 lb (92.1 kg)   07/12/18 203 lb 12.8 oz (92.4 kg)     Body mass index is 31.28 kg/(m^2).    Constitutional:  Reveals a pleasant female, she is accompanied by her daughter who is very supportive.  Vitals:  Per nursing notes.  HEENT:No cervical LAD, no thyromegaly,  conjunctiva is pink, no scleral icterus, TMs are visualized and normal bl, oropharynx is clear, no exudates, nasal passages are narrow and there is mild mucosal inflammation.  Cardiac:  Regular rate and rhythm,no murmurs, rubs, or gallops.  Legs without edema. Palpation of the radial pulse regular.  DP pulses are normal.  She does have mild decrease in sensation in her toes.  Lungs: Clear to auscultation bl.  Respiratory effort normal.  Abdomen:positive BS, soft, nontender, nondistended.  No hepato-splenomagaly  Skin:   Without rash, " bruise, or palpable lesions.  Rheumatologic: Normal joints and nails of the hands.  Neurologic:  Cranial nerves II-XII intact.     Psychiatric: affect appropriate, memory intact.     Data Review:    Analysis and Summary of Old Records (2): Yes    Records Requested (1): No    Other History Summarized (from other people in the room) (2): No    Radiology Tests Summarized (XRAY/CT/MRI/DXA) (1): No    Labs Reviewed (1): Yes    Medicine Tests Reviewed (EKG/ECHO/COLONOSCOPY/EGD) (1): No    Independent Review of EKG or X-RAY (2): No

## 2021-06-20 NOTE — PROGRESS NOTES
Dear  Airam Muhammad Md  [address] [place] [address]    Thank you for the opportunity to participate in the care of  Samira Kaur.    She is a 67 y.o. y/o who comes to the clinic to discuss several sleep problems.    She is having difficulties to fall asleep. She goes to bed around midnight and it takes her more than 1 hour to fall asleep. Her difficulties to fall asleep started approximately 1 year ago. The patient thinks that some of her difficulties to fall asleep started when she moved from [place] to [place]. She is following the same schedule that she was following when she lived in [place]. The patient would like to go to bed before midnight because she would like to take her of her spiritual duties in the morning and this forces her to wake up around 5 AM.     Once she falls asleep she is able to stay asleep without difficulties.    Routines around bedtime: she watches TV in bed from midnight until she falls asleep.    She has been taking trazodone 50 mg every evening but she takes it around 7 PM.     She was also referred to our clinic due to snoring and high hemoglobin with a potential high risk for obstructive sleep apnea. Her snoring started more than 5 years ago. The snoring is not very loud.  . The snoring happens every night.  She thinks that the snoring has been stable over time.     ESS: 17  STOP BAN      Past Medical History  Patient Active Problem List   Diagnosis     Type 2 diabetes mellitus with diabetic neuropathy, without long-term current use of insulin (H)     Hyperlipidemia LDL goal <100     Essential hypertension     Chronic renal impairment, stage 3 (moderate)     Adjustment insomnia          Past Surgical History  Past Surgical History:   Procedure Laterality Date     APPENDECTOMY       CHOLECYSTECTOMY          Meds  Current Outpatient Prescriptions   Medication Sig Dispense Refill     amLODIPine (NORVASC) 10 MG tablet Take 1 tablet (10 mg total) by mouth  "daily. 90 tablet 3     aspirin 81 MG EC tablet Take 1 tablet (81 mg total) by mouth daily.  0     atorvastatin (LIPITOR) 20 MG tablet Take 1 tablet (20 mg total) by mouth daily. 90 tablet 2     benazepril (LOTENSIN) 20 MG tablet Take 1 tablet (20 mg total) by mouth daily. 90 tablet 3     blood glucose meter (GLUCOMETER) Use 1 each As Directed 2 (two) times a day. Dispense glucometer brand per patient's insurance at pharmacy discretion. 1 each 0     blood glucose test strips Use 1 each As Directed 2 (two) times a day. Dispense brand per patient's insurance at pharmacy discretion. 100 strip 11     carvedilol (COREG) 25 MG tablet Take 1 tablet (25 mg total) by mouth 2 (two) times a day. 180 tablet 3     cholecalciferol, vitamin D3, 3,000 unit Tab One tab a day 90 tablet 2     fluticasone (FLONASE ALLERGY RELIEF) 50 mcg/actuation nasal spray 1 spray into each nostril daily. 16 g 2     generic lancets Use 1 each As Directed 2 (two) times a day. Dispense brand per patient's insurance at pharmacy discretion. 100 each 11     glipiZIDE (GLUCOTROL) 10 MG tablet Take 2 tablets (20 mg total) by mouth 2 (two) times a day. 360 tablet 3     hydrALAZINE (APRESOLINE) 25 MG tablet Take 1 tablet (25 mg total) by mouth 2 (two) times a day. 180 tablet 2     insulin glargine (LANTUS; BASAGLAR) 100 unit/mL (3 mL) pen Inject 20 Units under the skin at bedtime. 15 mL 3     pen needle, diabetic (ULTICARE PEN NEEDLE) 32 gauge x 5/32\" Ndle Use 1 each As Directed daily. With lantus 100 each 3     semaglutide (OZEMPIC) 0.25 mg or 0.5 mg(2 mg/1.5 mL) PnIj Inject 0.25 mg under the skin every 7 days. For four weeks, then increase to 0.5mg weekly for four weeks. 1.5 mL 1     traZODone (DESYREL) 50 MG tablet Take 2 tablets (100 mg total) by mouth at bedtime. 60 tablet 2     No current facility-administered medications for this visit.         Allergies  Review of patient's allergies indicates no known allergies.     Social History  Social History " "    Social History     Marital status: Single     Spouse name: N/A     Number of children: N/A     Years of education: N/A     Occupational History     Not on file.     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        Family History  Family History   Problem Relation Age of Onset     Anorexia nervosa Mother      Diabetes Father            Review of Systems:  Constitutional: Negative except as noted in HPI.   Eyes: Negative except as noted in HPI.   ENT: Negative except as noted in HPI.   Cardiovascular: Negative except as noted in HPI.   Respiratory: Negative except as noted in HPI.   Gastrointestinal: Negative except as noted in HPI.   Genitourinary: Negative except as noted in HPI.   Musculoskeletal: Negative except as noted in HPI.   Integumentary: Negative except as noted in HPI.   Neurological: Negative except as noted in HPI.   Psychiatric: Negative except as noted in HPI.   Endocrine: Negative except as noted in HPI.   Hematologic/Lymphatic: Negative except as noted in HPI.      Physical Exam:  /75  Pulse 75  Ht 5' 7\" (1.702 m)  Wt 208 lb (94.3 kg)  SpO2 100%  BMI 32.58 kg/m2  BMI:Body mass index is 32.58 kg/(m^2).   GEN: NAD, obese  Head: Normocephalic.  EYES: PERRLA, EOMI  ENT: Oropharynx is clear, Mallampatti class IV airway. Uvula is edematous  Teeth: patient uses dentures.   Nasal mucosa is pink  Neck : Thyroid is not palpable  CV: Regular rate and rhythm, S1 & S2 are normal. No murmurs  LUNGS: clear to auscultation bilaterally, no wheezes  MUSCULOSKELETAL: no clubbing, cyanosis or edema  SKIN: warm, dry, no rashes  Neurological: Alert, oriented to " time, place, and person.  Psych:  normal mood, normal affect     Labs/Studies:     Lab Results   Component Value Date    WBC 7.9 06/25/2018    HGB 16.6 (H) 06/25/2018    HCT 48.8 (H) 06/25/2018    MCV 83 06/25/2018     06/25/2018         Chemistry        Component Value Date/Time     08/02/2018 1123    K 5.1 (H) 08/02/2018 1123     08/02/2018 1123    CO2 27 08/02/2018 1123    BUN 35 (H) 08/02/2018 1123    CREATININE 2.05 (H) 08/02/2018 1123     (H) 08/02/2018 1123        Component Value Date/Time    CALCIUM 9.5 08/02/2018 1123    ALKPHOS 104 06/25/2018 1245    AST 21 06/25/2018 1245    ALT 23 06/25/2018 1245    BILITOT 1.2 (H) 06/25/2018 1245            No results found for: FERRITIN  Lab Results   Component Value Date    TSH 1.04 06/25/2018     Lab Results   Component Value Date    HGBA1C 11.5 (H) 08/02/2018           Assessment and Plan:  In summary Samira Kaur is a 67 y.o. year old female here for consultation.  1. Snoring/ risk for sleep apnea/ elevated hemoglobin.  Samira Kaur has high risk for obstructive sleep apnea based on the results of her STOP BANG questionnaire, ESS, and crowded airway.  Recommend getting an overnight polysomnography to split per protocol.  The patient should return to the clinic to discuss results and treatment option in a patient-centered approach.    2.Delayed sleep phase disorder.  We talked about the nature of this problem.  She thinks that she could organize a period of two weeks without having to wake up to her alarm and this may be a good opportunity to validate the presence of DSPD.  Discontinue trazodone for now since the patient is taking it too early and it is not being effective.   We will consider light therapy and a chronoagent after we complete sleep/wake logs.       Patient verbalized understanding of these issues, agrees with the plan and all questions were answered today. Patient was given an opportuntity to voice any other symptoms or  concerns not listed above. Patient did not have any other symptoms or concerns.      MD BUTCH Thomas Board Certified in Internal Medicine and Sleep Medicine  [facility].

## 2021-06-20 NOTE — PROGRESS NOTES
MTM Initial Encounter  Assessment & Plan                                                     1. Type 2 diabetes mellitus with diabetic neuropathy, without long-term current use of insulin (H)  Not at goal A1c less than 7% per ADA guidelines, however I believe at next A1c check, her A1c will be significantly improved.  Her A1c had decreased by approximately 3% after only 1 month of restarting medications.  Discussed extensively her weight gain over the last few months, which is likely due to inactivity and addition of insulin to her max dose glipizide.  I discussed a GLP-1 agonists, both once daily Victoza and once weekly Ozempic.  Pending insurance coverage will initiate Ozempic once weekly.  If this is effective, will start titrating down glipizide to help improve weight loss and decrease appetite and cravings.  She demonstrates understanding of how to utilize the device.  We will follow-up with her blood sugars via my chart 1 week after starting her GLP-1 agonist.  Congratulated her on her efforts to go to the gym every day, as this will likely greatly improve her blood sugars, particularly her postprandial blood sugars after lunch.  Stable on aspirin, statin, ACE inhibitor.  -Initiate semaglutide (OZEMPIC) 0.25 mg or 0.5 mg(2 mg/1.5 mL) PnIj; Inject 0.25 mg under the skin every 7 days. For four weeks, then increase to 0.5mg weekly for four weeks.  Dispense: 1.5 mL; Refill: 1  -Pending blood sugars, consider titrating down glipizide, if GLP-1 agonist is effective      Follow Up  Return in about 2 weeks (around 9/27/2018) for via "[employer]".      Subjective & Objective                                                     Samira Kaur is a 67 y.o. female coming in for an initial visit for Medication Therapy Management. She was referred to me from Airam Muhammad MD. Just got back from [place], and her sugars were more erratic.  She follows up today in clinic with her daughter-in-law who is also a support system for her  here in [place].  Her daughter Noel was not able to attend today.    Chief Complaint: Follow-up    Medication Adherence/Access: She is very adherent to her medications, however while she was in [place] for over a month, she notes that her diabetes diet did slip quite a bit.    Diabetes: She continues to be very adherent with checking her blood sugars 3 times daily.  Fasting blood sugars are essentially all at goal.  Sugars before lunch and after dinner are somewhat elevated, however significantly improved and now that she is back from [place].  She is going to start going to the gym, starting yesterday.  She plans to go every single day around 2 PM.  She eats very healthy while she is here under the care of her family.  She also plans to go to [place] for 2 weeks at the end of October.  She is open to an additional injection, and hopes to lose weight.  She has again gained a little bit more weight than her last appointment with PCP.  Discussed different options including once daily or once weekly injections.  She is mostly interested in a once weekly injection, however understands that this depends on her insurance coverage.  Lab Results   Component Value Date    HGBA1C 11.5 (H) 08/02/2018    HGBA1C >14.0 (H) 06/25/2018     Lab Results   Component Value Date    MICROALBUR 335.44 (H) 06/25/2018    LDLCALC 80 06/25/2018    CREATININE 2.05 (H) 08/02/2018     PMH: reviewed in EPIC   Allergies/ADRs: reviewed in EPIC   Alcohol: reviewed in EPIC   Tobacco:   History   Smoking Status     Former Smoker     Years: 10.00   Smokeless Tobacco     Never Used     Today's Vitals:   Vitals:    09/13/18 0941   BP: 130/74   Pulse: 64   Weight: 204 lb 8 oz (92.8 kg)     ----------------    Much or all of the text in this note was generated through the use of Dragon Dictate voice-to-text software. Errors in spelling or words which seem out of context are unintentional. Sound alike errors, in particular, may have escaped editing.    The  patient was given a summary of these recommendations as an after visit summary    I spent 30 minutes with this patient today.   All changes were made via collaborative practice agreement with Airam Muhammad MD. A copy of the visit note was provided to the patient's provider.     Yasmany Moy, PharmD, BCACP  Medication Management (MTM) Pharmacist  [facility]         Current Outpatient Prescriptions   Medication Sig Dispense Refill     amLODIPine (NORVASC) 10 MG tablet Take 1 tablet (10 mg total) by mouth daily. 90 tablet 3     aspirin 81 MG EC tablet Take 1 tablet (81 mg total) by mouth daily.  0     atorvastatin (LIPITOR) 20 MG tablet Take 1 tablet (20 mg total) by mouth daily. 90 tablet 2     benazepril (LOTENSIN) 20 MG tablet Take 1 tablet (20 mg total) by mouth daily. 90 tablet 3     blood glucose meter (GLUCOMETER) Use 1 each As Directed 2 (two) times a day. Dispense glucometer brand per patient's insurance at pharmacy discretion. 1 each 0     blood glucose test strips Use 1 each As Directed 2 (two) times a day. Dispense brand per patient's insurance at pharmacy discretion. 100 strip 11     carvedilol (COREG) 25 MG tablet Take 1 tablet (25 mg total) by mouth 2 (two) times a day. 180 tablet 3     cholecalciferol, vitamin D3, 3,000 unit Tab One tab a day 90 tablet 2     fluticasone (FLONASE ALLERGY RELIEF) 50 mcg/actuation nasal spray 1 spray into each nostril daily. 16 g 2     generic lancets Use 1 each As Directed 2 (two) times a day. Dispense brand per patient's insurance at pharmacy discretion. 100 each 11     glipiZIDE (GLUCOTROL) 10 MG tablet Take 2 tablets (20 mg total) by mouth 2 (two) times a day. 360 tablet 3     hydrALAZINE (APRESOLINE) 25 MG tablet Take 1 tablet (25 mg total) by mouth 2 (two) times a day. 180 tablet 2     insulin glargine (LANTUS; BASAGLAR) 100 unit/mL (3 mL) pen Inject 20 Units under the skin at bedtime. 15 mL 3     pen needle, diabetic (ULTICARE PEN NEEDLE) 32  "gauge x 5/32\" Ndle Use 1 each As Directed daily. With lantus 100 each 3     semaglutide (OZEMPIC) 0.25 mg or 0.5 mg(2 mg/1.5 mL) PnIj Inject 0.25 mg under the skin every 7 days. For four weeks, then increase to 0.5mg weekly for four weeks. 1.5 mL 1     traZODone (DESYREL) 50 MG tablet Take 2 tablets (100 mg total) by mouth at bedtime. 60 tablet 2     No current facility-administered medications for this visit.              "

## 2021-06-26 NOTE — PROGRESS NOTES
"Progress Notes by Sherry Melo, Diabetes Ed at 7/12/2018 10:00 AM     Author: Sherry Melo, Diabetes Ed Service: -- Author Type: Diabetes Ed    Filed: 7/12/2018  1:07 PM Encounter Date: 7/12/2018 Status: Attested    : Sherry Melo, Diabetes Ed (Diabetes Ed) Cosigner: Airam Muhammad MD at 7/12/2018  2:03 PM    Attestation signed by Airam Muhammad MD at 7/12/2018  2:03 PM    agree                Assessment: Samira is here with her daughter, Juan, today for initial education for Diabetes.  Stated she has known she had Diabetes since 2011, she was being managed by a  doctor at that time.  More recently she decided to stop taking all of her medications, stated this is how she ended up \"in this crisis\".    She is currently taking GLipizide 20mg two times a day.  Discussed why medication is more effective if taken before meals, she will change this.  She is also taking Basaglar.  She is up to 24 units daily.  Stated she was told to continue to increase until her FBG was under 160.  This morning's reading was 139.  Advised to stay at this dose until she meets with  Rosanna from pharmacy again or Dr. Muhammad.    She is checking her blood sugar twice daily, fasting and 2 hours after lunch.  Her fasting readings have improved quite a bit from 250 before starting insulin to 139 this morning.  Stated her after lunch readings have not been getting much better, started around 277, yesterday was 215.  Discussed continuing to check at these times.      Jaun stated her mother is trying to be more active and has joined a fitness center.  She likes to walk on the treadmill.  Stated she does have some problems with her left foot and knees and she is worried her mother will hurt herself more.  Discussed starting slowly, 15 minutes at a time and see how she feels with that.  For glucose control, better to increase frequency first then duration.    We reviewed her current meal plan:  B-[place] " Essentials drink, 1 piece of bread and butter  L-same as breakfast, sometimes will have oatmeal  D-salads, bread with butter  Snacks-crackers with cheese or peanut butter  She does not drink soda or juice  Milk-some  Yogurt-yes  Cheese-yes  Cottage cheese-no  Nuts-yes  Vegetables-likes all, tolerates broccoli  Fruit-no apples  Fish-occasionally  Eggs-yes  We discussed the [place] Essentials she is drinking, this has anywhere from 30-41 grams of carb per serving depending on flavor and kind of milk.  This is not something to avoid, but better when having some kind of protein source.  She does eat meat, mainly chicken.  Reviewed carb counting, carb ID, how many grams of carb to eat in a sitting, how often to eat, balancing meals, avoiding sodium and heart healthy fats.      Plan: Samira plans to work with Juan on meal planning and food prepping.  Will continue with same plan otherwise.  Asked to return in 4 weeks.  No appointment made at this time.  Gave card, can contact me through Myrl with questions.    Subjective and Objective:      Samira Kaur is referred by Dr. Airam Muhammad for Diabetes Education.     Lab Results   Component Value Date    HGBA1C >14.0 (H) 06/25/2018         Goals     ? HEMOGLOBIN A1C < 8.0            Goals for 7.12.18:  Monitoring-  Continue checking blood sugar fasting and 2 hours after lunch  Medication-  Take Glipizide before meals, see if this help post prandial readings  Nutrition-  Aim for 20-45 grams of carb for all 3 meals and 1 snack of 0-15 grams of carb per day  Try to eat something every 4-6 hours  Ensure having protein/fiber with meals and snacks            Follow up:   pharmacy for follow-up      Education:     Monitoring   Meter (per above goals): Assessed and Discussed  Monitoring: Assessed and Discussed  BG goals: Assessed, Discussed and Literature provided    Nutrition Management  Nutrition Management: Assessed and Discussed  Weight: Assessed and  Discussed  Portions/Balance: Assessed, Discussed and Literature provided  Carb ID/Count: Assessed, Discussed and Literature provided  Label Reading: Assessed, Discussed and Literature provided  Heart Healthy Fats: Assessed, Discussed and Literature provided  Menu Planning: Assessed and Discussed  Dining Out: Not addressed  Physical Activity: Assessed and Discussed  Medications: Assessed and Discussed  Orals: Assessed, Discussed and Literature provided  Injected Medications: Assessed, Discussed and Literature provided   Storage/Exp:Assessed and Discussed   Site Rotation: Assessed and Discussed   Sites Assessed: no    Diabetes Disease Process: Assessed and Discussed    Acute Complications: Prevent, Detect, Treat:  Hypoglycemia: Assessed and Discussed  Hyperglycemia: Assessed and Discussed  Sick Days: Not addressed  Driving: Not addressed    Chronic Complications  Foot Care:Not addressed  Skin Care: Not addressed  Eye: Assessed, Discussed and has exam scheduled in a couple weeks  ABC: Not addressed  Teeth:Not addressed  Goal Setting and Problem Solving: Assessed and Discussed  Barriers: Assessed and Discussed  Psychosocial Adjustments: Assessed and Discussed      Time spent with the patient: 60 minutes for diabetes education and counseling.   Previous Education: yes  Visit Type:DSMT  Hours Remaining: DSMT 1 and MNT 2      Sherry Melo  7/12/2018

## 2021-07-14 PROBLEM — N18.30 CHRONIC RENAL IMPAIRMENT, STAGE 3 (MODERATE) (H): Status: RESOLVED | Noted: 2018-06-25 | Resolved: 2019-06-27

## 2021-10-10 ENCOUNTER — HEALTH MAINTENANCE LETTER (OUTPATIENT)
Age: 71
End: 2021-10-10

## 2021-12-29 DIAGNOSIS — E11.40 TYPE 2 DIABETES MELLITUS WITH DIABETIC NEUROPATHY, WITHOUT LONG-TERM CURRENT USE OF INSULIN (H): ICD-10-CM

## 2021-12-31 RX ORDER — SEMAGLUTIDE 1.34 MG/ML
INJECTION, SOLUTION SUBCUTANEOUS
Qty: 1.5 ML | Refills: 4 | Status: SHIPPED | OUTPATIENT
Start: 2021-12-31 | End: 2022-03-10

## 2021-12-31 NOTE — TELEPHONE ENCOUNTER
"Routing refill request to provider for review/approval because:  Labs out of range:  A1c, creatinine  Patient needs to be seen because it has been more than 1 year since last office visit.    Last Written Prescription Date:  11/11/2020  Last Fill Quantity: 3,  # refills: 6   Last office visit provider:  6/27/2019     Requested Prescriptions   Pending Prescriptions Disp Refills     OZEMPIC (0.25 OR 0.5 MG/DOSE) 2 MG/1.5ML SOPN pen [Pharmacy Med Name: OZEMPIC 2 MG/1.5ML Solution Pen-injector]       Sig: INJECT 0.5 MG UNDER THE SKIN EVERY 7 DAYS. (DISCARD AND BEGIN NEW PEN EVERY 56 DAYS)       GLP-1 Agonists Protocol Failed - 12/29/2021  6:56 AM        Failed - HgbA1C in past 3 or 6 months     If HgbA1C is 8 or greater, it needs to be on file within the past 3 months.  If less than 8, must be on file within the past 6 months.     Recent Labs   Lab Test 06/27/19  1303   A1C 6.4*             Failed - Normal serum creatinine on file in past 12 months     Recent Labs   Lab Test 04/11/19  1338   CR 2.03*       Ok to refill medication if creatinine is low          Failed - Recent (6 mo) or future (30 days) visit within the authorizing provider's specialty     Patient had office visit in the last 6 months or has a visit in the next 30 days with authorizing provider.  See \"Patient Info\" tab in inbasket, or \"Choose Columns\" in Meds & Orders section of the refill encounter.            Passed - Medication is active on med list        Passed - Patient is age 18 or older        Passed - No active pregnancy on record        Passed - No positive pregnancy test in past 12 months             Tri Bruce RN 12/31/21 5:55 AM  "

## 2022-01-29 ENCOUNTER — HEALTH MAINTENANCE LETTER (OUTPATIENT)
Age: 72
End: 2022-01-29

## 2022-03-10 DIAGNOSIS — E11.40 TYPE 2 DIABETES MELLITUS WITH DIABETIC NEUROPATHY, WITHOUT LONG-TERM CURRENT USE OF INSULIN (H): ICD-10-CM

## 2022-03-10 RX ORDER — SEMAGLUTIDE 1.34 MG/ML
INJECTION, SOLUTION SUBCUTANEOUS
Qty: 3 ML | Refills: 0 | Status: SHIPPED | OUTPATIENT
Start: 2022-03-10 | End: 2022-05-23

## 2022-03-10 NOTE — TELEPHONE ENCOUNTER
"Routing refill request to provider for review/approval because:  Labs not current:  Multiple  Patient needs to be seen because it has been more than 1 year since last office visit.    Last Written Prescription Date:  12/31/21  Last Fill Quantity: 1.5 ml,  # refills: 4   Last office visit provider:  6/27/2019     Requested Prescriptions   Pending Prescriptions Disp Refills     OZEMPIC (0.25 OR 0.5 MG/DOSE) 2 MG/1.5ML SOPN pen [Pharmacy Med Name: OZEMPIC 2 MG/1.5ML Solution Pen-injector]       Sig: INJECT 0.5 MG UNDER THE SKIN EVERY 7 DAYS. (DISCARD AND BEGIN NEW PEN EVERY 56 DAYS)       GLP-1 Agonists Protocol Failed - 3/10/2022 12:48 PM        Failed - HgbA1C in past 3 or 6 months     If HgbA1C is 8 or greater, it needs to be on file within the past 3 months.  If less than 8, must be on file within the past 6 months.     Recent Labs   Lab Test 06/27/19  1303   A1C 6.4*             Failed - Normal serum creatinine on file in past 12 months     Recent Labs   Lab Test 04/11/19  1338   CR 2.03*       Ok to refill medication if creatinine is low          Failed - Recent (6 mo) or future (30 days) visit within the authorizing provider's specialty     Patient had office visit in the last 6 months or has a visit in the next 30 days with authorizing provider.  See \"Patient Info\" tab in inbasket, or \"Choose Columns\" in Meds & Orders section of the refill encounter.            Passed - Medication is active on med list        Passed - Patient is age 18 or older        Passed - No active pregnancy on record        Passed - No positive pregnancy test in past 12 months             Sreedhar Su RN 03/10/22 12:49 PM  "

## 2022-05-22 DIAGNOSIS — E11.40 TYPE 2 DIABETES MELLITUS WITH DIABETIC NEUROPATHY, WITHOUT LONG-TERM CURRENT USE OF INSULIN (H): ICD-10-CM

## 2022-05-23 RX ORDER — SEMAGLUTIDE 1.34 MG/ML
INJECTION, SOLUTION SUBCUTANEOUS
Qty: 6 ML | Refills: 0 | Status: SHIPPED | OUTPATIENT
Start: 2022-05-23 | End: 2022-10-09

## 2022-05-23 NOTE — TELEPHONE ENCOUNTER
Refill signed, pt has not been seen since 2019.Please ask to schedule PE, DM f/u for further refills.

## 2022-05-23 NOTE — TELEPHONE ENCOUNTER
"Routing refill request to provider for review/approval because:  Labs not current:  Multiple  Patient needs to be seen because it has been more than 6 months since last office visit.    Last Written Prescription Date:  3/10/22  Last Fill Quantity: 3 ml,  # refills: 0   Last office visit provider:  6/27/2019     Requested Prescriptions   Pending Prescriptions Disp Refills     OZEMPIC (0.25 OR 0.5 MG/DOSE) 2 MG/1.5ML SOPN pen [Pharmacy Med Name: OZEMPIC 2 MG/1.5ML Solution Pen-injector]       Sig: INJECT 0.5 MG UNDER THE SKIN EVERY 7 DAYS. (DISCARD AND BEGIN NEW PEN EVERY 56 DAYS)       GLP-1 Agonists Protocol Failed - 5/23/2022 11:26 AM        Failed - HgbA1C in past 3 or 6 months     If HgbA1C is 8 or greater, it needs to be on file within the past 3 months.  If less than 8, must be on file within the past 6 months.     Recent Labs   Lab Test 06/27/19  1303   A1C 6.4*             Failed - Normal serum creatinine on file in past 12 months     Recent Labs   Lab Test 04/11/19  1338   CR 2.03*       Ok to refill medication if creatinine is low          Failed - Recent (6 mo) or future (30 days) visit within the authorizing provider's specialty     Patient had office visit in the last 6 months or has a visit in the next 30 days with authorizing provider.  See \"Patient Info\" tab in inbasket, or \"Choose Columns\" in Meds & Orders section of the refill encounter.            Passed - Medication is active on med list        Passed - Patient is age 18 or older        Passed - No active pregnancy on record        Passed - No positive pregnancy test in past 12 months             Sreedhar Su RN 05/23/22 11:26 AM  "

## 2022-07-16 ENCOUNTER — HEALTH MAINTENANCE LETTER (OUTPATIENT)
Age: 72
End: 2022-07-16

## 2022-09-18 ENCOUNTER — HEALTH MAINTENANCE LETTER (OUTPATIENT)
Age: 72
End: 2022-09-18

## 2022-10-07 DIAGNOSIS — E11.40 TYPE 2 DIABETES MELLITUS WITH DIABETIC NEUROPATHY, WITHOUT LONG-TERM CURRENT USE OF INSULIN (H): ICD-10-CM

## 2022-10-08 NOTE — TELEPHONE ENCOUNTER
"Routing refill request to provider for review/approval because:  Labs not current:  a1c cr    Last Written Prescription Date:  5/23/22  Last Fill Quantity: 6,  # refills: 0   Last office visit provider:  6/27/2019     Requested Prescriptions   Pending Prescriptions Disp Refills     OZEMPIC (0.25 OR 0.5 MG/DOSE) 2 MG/1.5ML SOPN pen [Pharmacy Med Name: OZEMPIC 2 MG/1.5ML Solution Pen-injector]       Sig: INJECT 0.5 MG UNDER THE SKIN EVERY 7 DAYS. (DISCARD AND BEGIN NEW PEN EVERY 56 DAYS)       GLP-1 Agonists Protocol Failed - 10/7/2022  4:28 PM        Failed - HgbA1C in past 3 or 6 months     If HgbA1C is 8 or greater, it needs to be on file within the past 3 months.  If less than 8, must be on file within the past 6 months.     Recent Labs   Lab Test 06/27/19  1303   A1C 6.4*             Failed - Normal serum creatinine on file in past 12 months     Recent Labs   Lab Test 04/11/19  1338   CR 2.03*       Ok to refill medication if creatinine is low          Failed - Recent (6 mo) or future (30 days) visit within the authorizing provider's specialty     Patient had office visit in the last 6 months or has a visit in the next 30 days with authorizing provider.  See \"Patient Info\" tab in inbasket, or \"Choose Columns\" in Meds & Orders section of the refill encounter.            Passed - Medication is active on med list        Passed - Patient is age 18 or older        Passed - No active pregnancy on record        Passed - No positive pregnancy test in past 12 months             Caitlin Wyatt RN 10/08/22 3:39 PM  "

## 2022-10-09 RX ORDER — SEMAGLUTIDE 1.34 MG/ML
INJECTION, SOLUTION SUBCUTANEOUS
Qty: 3 ML | Refills: 11 | Status: SHIPPED | OUTPATIENT
Start: 2022-10-09

## 2022-10-09 NOTE — TELEPHONE ENCOUNTER
Refill signed,  Please ask pt to schedule AWE, last visit was in 2019. We need to undate labs, vaccines, etc.

## 2023-05-06 ENCOUNTER — HEALTH MAINTENANCE LETTER (OUTPATIENT)
Age: 73
End: 2023-05-06

## 2023-07-30 ENCOUNTER — HEALTH MAINTENANCE LETTER (OUTPATIENT)
Age: 73
End: 2023-07-30

## 2023-12-17 ENCOUNTER — HEALTH MAINTENANCE LETTER (OUTPATIENT)
Age: 73
End: 2023-12-17